# Patient Record
Sex: MALE | Race: WHITE | NOT HISPANIC OR LATINO | URBAN - METROPOLITAN AREA
[De-identification: names, ages, dates, MRNs, and addresses within clinical notes are randomized per-mention and may not be internally consistent; named-entity substitution may affect disease eponyms.]

---

## 2024-10-11 PROBLEM — Z00.00 ENCOUNTER FOR PREVENTIVE HEALTH EXAMINATION: Status: ACTIVE | Noted: 2024-10-11

## 2024-10-14 ENCOUNTER — INPATIENT (INPATIENT)
Facility: HOSPITAL | Age: 59
LOS: 7 days | Discharge: HOME HEALTH | DRG: 236 | End: 2024-10-22
Attending: THORACIC SURGERY (CARDIOTHORACIC VASCULAR SURGERY) | Admitting: THORACIC SURGERY (CARDIOTHORACIC VASCULAR SURGERY)
Payer: COMMERCIAL

## 2024-10-14 VITALS — OXYGEN SATURATION: 95 % | DIASTOLIC BLOOD PRESSURE: 89 MMHG | SYSTOLIC BLOOD PRESSURE: 144 MMHG | HEART RATE: 72 BPM

## 2024-10-14 DIAGNOSIS — Z98.890 OTHER SPECIFIED POSTPROCEDURAL STATES: Chronic | ICD-10-CM

## 2024-10-14 LAB
A1C WITH ESTIMATED AVERAGE GLUCOSE RESULT: 5.6 % — SIGNIFICANT CHANGE UP (ref 4–5.6)
ALBUMIN SERPL ELPH-MCNC: 4.6 G/DL — SIGNIFICANT CHANGE UP (ref 3.3–5)
ALP SERPL-CCNC: 72 U/L — SIGNIFICANT CHANGE UP (ref 40–120)
ALT FLD-CCNC: 15 U/L — SIGNIFICANT CHANGE UP (ref 10–45)
ANION GAP SERPL CALC-SCNC: 11 MMOL/L — SIGNIFICANT CHANGE UP (ref 5–17)
APTT BLD: 31.5 SEC — SIGNIFICANT CHANGE UP (ref 24.5–35.6)
AST SERPL-CCNC: 19 U/L — SIGNIFICANT CHANGE UP (ref 10–40)
BASOPHILS # BLD AUTO: 0.07 K/UL — SIGNIFICANT CHANGE UP (ref 0–0.2)
BASOPHILS NFR BLD AUTO: 0.8 % — SIGNIFICANT CHANGE UP (ref 0–2)
BILIRUB SERPL-MCNC: 0.6 MG/DL — SIGNIFICANT CHANGE UP (ref 0.2–1.2)
BLD GP AB SCN SERPL QL: NEGATIVE — SIGNIFICANT CHANGE UP
BUN SERPL-MCNC: 11 MG/DL — SIGNIFICANT CHANGE UP (ref 7–23)
CALCIUM SERPL-MCNC: 9.3 MG/DL — SIGNIFICANT CHANGE UP (ref 8.4–10.5)
CHLORIDE SERPL-SCNC: 102 MMOL/L — SIGNIFICANT CHANGE UP (ref 96–108)
CHOLEST SERPL-MCNC: 170 MG/DL — SIGNIFICANT CHANGE UP
CO2 SERPL-SCNC: 25 MMOL/L — SIGNIFICANT CHANGE UP (ref 22–31)
CREAT SERPL-MCNC: 0.96 MG/DL — SIGNIFICANT CHANGE UP (ref 0.5–1.3)
EGFR: 90 ML/MIN/1.73M2 — SIGNIFICANT CHANGE UP
EOSINOPHIL # BLD AUTO: 0.05 K/UL — SIGNIFICANT CHANGE UP (ref 0–0.5)
EOSINOPHIL NFR BLD AUTO: 0.5 % — SIGNIFICANT CHANGE UP (ref 0–6)
ESTIMATED AVERAGE GLUCOSE: 114 MG/DL — SIGNIFICANT CHANGE UP (ref 68–114)
GLUCOSE SERPL-MCNC: 98 MG/DL — SIGNIFICANT CHANGE UP (ref 70–99)
HCT VFR BLD CALC: 45.7 % — SIGNIFICANT CHANGE UP (ref 39–50)
HDLC SERPL-MCNC: 42 MG/DL — SIGNIFICANT CHANGE UP
HGB BLD-MCNC: 15.1 G/DL — SIGNIFICANT CHANGE UP (ref 13–17)
IMM GRANULOCYTES NFR BLD AUTO: 0.2 % — SIGNIFICANT CHANGE UP (ref 0–0.9)
INR BLD: 1.02 — SIGNIFICANT CHANGE UP (ref 0.85–1.16)
LIPID PNL WITH DIRECT LDL SERPL: 107 MG/DL — HIGH
LYMPHOCYTES # BLD AUTO: 1.51 K/UL — SIGNIFICANT CHANGE UP (ref 1–3.3)
LYMPHOCYTES # BLD AUTO: 16.4 % — SIGNIFICANT CHANGE UP (ref 13–44)
MCHC RBC-ENTMCNC: 28.1 PG — SIGNIFICANT CHANGE UP (ref 27–34)
MCHC RBC-ENTMCNC: 33 GM/DL — SIGNIFICANT CHANGE UP (ref 32–36)
MCV RBC AUTO: 84.9 FL — SIGNIFICANT CHANGE UP (ref 80–100)
MONOCYTES # BLD AUTO: 0.67 K/UL — SIGNIFICANT CHANGE UP (ref 0–0.9)
MONOCYTES NFR BLD AUTO: 7.3 % — SIGNIFICANT CHANGE UP (ref 2–14)
NEUTROPHILS # BLD AUTO: 6.87 K/UL — SIGNIFICANT CHANGE UP (ref 1.8–7.4)
NEUTROPHILS NFR BLD AUTO: 74.8 % — SIGNIFICANT CHANGE UP (ref 43–77)
NON HDL CHOLESTEROL: 128 MG/DL — SIGNIFICANT CHANGE UP
NRBC # BLD: 0 /100 WBCS — SIGNIFICANT CHANGE UP (ref 0–0)
NT-PROBNP SERPL-SCNC: <36 PG/ML — SIGNIFICANT CHANGE UP (ref 0–300)
PLATELET # BLD AUTO: 239 K/UL — SIGNIFICANT CHANGE UP (ref 150–400)
POTASSIUM SERPL-MCNC: 3.9 MMOL/L — SIGNIFICANT CHANGE UP (ref 3.5–5.3)
POTASSIUM SERPL-SCNC: 3.9 MMOL/L — SIGNIFICANT CHANGE UP (ref 3.5–5.3)
PROT SERPL-MCNC: 8 G/DL — SIGNIFICANT CHANGE UP (ref 6–8.3)
PROTHROM AB SERPL-ACNC: 11.9 SEC — SIGNIFICANT CHANGE UP (ref 9.9–13.4)
RBC # BLD: 5.38 M/UL — SIGNIFICANT CHANGE UP (ref 4.2–5.8)
RBC # FLD: 12.8 % — SIGNIFICANT CHANGE UP (ref 10.3–14.5)
RH IG SCN BLD-IMP: POSITIVE — SIGNIFICANT CHANGE UP
SODIUM SERPL-SCNC: 138 MMOL/L — SIGNIFICANT CHANGE UP (ref 135–145)
TRIGL SERPL-MCNC: 113 MG/DL — SIGNIFICANT CHANGE UP
TROPONIN T, HIGH SENSITIVITY RESULT: 9 NG/L — SIGNIFICANT CHANGE UP (ref 0–51)
TSH SERPL-MCNC: 6 UIU/ML — HIGH (ref 0.27–4.2)
WBC # BLD: 9.19 K/UL — SIGNIFICANT CHANGE UP (ref 3.8–10.5)
WBC # FLD AUTO: 9.19 K/UL — SIGNIFICANT CHANGE UP (ref 3.8–10.5)

## 2024-10-14 PROCEDURE — 93010 ELECTROCARDIOGRAM REPORT: CPT

## 2024-10-14 PROCEDURE — 71046 X-RAY EXAM CHEST 2 VIEWS: CPT | Mod: 26

## 2024-10-14 PROCEDURE — 93880 EXTRACRANIAL BILAT STUDY: CPT | Mod: 26

## 2024-10-14 PROCEDURE — 71250 CT THORAX DX C-: CPT | Mod: 26

## 2024-10-14 RX ORDER — METOPROLOL TARTRATE 50 MG
12.5 TABLET ORAL EVERY 12 HOURS
Refills: 0 | Status: DISCONTINUED | OUTPATIENT
Start: 2024-10-14 | End: 2024-10-16

## 2024-10-14 RX ORDER — INFLUENZ VIR VAC TV P-SURF2003 15MCG/.5ML
0.5 SYRINGE (ML) INTRAMUSCULAR ONCE
Refills: 0 | Status: DISCONTINUED | OUTPATIENT
Start: 2024-10-14 | End: 2024-10-22

## 2024-10-14 RX ORDER — ACETAMINOPHEN 500 MG
650 TABLET ORAL EVERY 6 HOURS
Refills: 0 | Status: DISCONTINUED | OUTPATIENT
Start: 2024-10-14 | End: 2024-10-16

## 2024-10-14 RX ORDER — PANTOPRAZOLE SODIUM 40 MG/1
40 TABLET, DELAYED RELEASE ORAL
Refills: 0 | Status: DISCONTINUED | OUTPATIENT
Start: 2024-10-14 | End: 2024-10-16

## 2024-10-14 RX ORDER — HEPARIN SODIUM 10000 [USP'U]/ML
5000 INJECTION INTRAVENOUS; SUBCUTANEOUS EVERY 8 HOURS
Refills: 0 | Status: DISCONTINUED | OUTPATIENT
Start: 2024-10-15 | End: 2024-10-16

## 2024-10-14 RX ORDER — POLYETHYLENE GLYCOL 3350 17 G/17G
17 POWDER, FOR SOLUTION ORAL DAILY
Refills: 0 | Status: DISCONTINUED | OUTPATIENT
Start: 2024-10-14 | End: 2024-10-16

## 2024-10-14 RX ORDER — SODIUM CHLORIDE 9 MG/ML
3 INJECTION, SOLUTION INTRAMUSCULAR; INTRAVENOUS; SUBCUTANEOUS EVERY 8 HOURS
Refills: 0 | Status: DISCONTINUED | OUTPATIENT
Start: 2024-10-14 | End: 2024-10-16

## 2024-10-14 RX ORDER — ASPIRIN/MAG CARB/ALUMINUM AMIN 325 MG
81 TABLET ORAL DAILY
Refills: 0 | Status: DISCONTINUED | OUTPATIENT
Start: 2024-10-14 | End: 2024-10-16

## 2024-10-14 RX ORDER — ROSUVASTATIN CALCIUM 10 MG
40 TABLET ORAL AT BEDTIME
Refills: 0 | Status: DISCONTINUED | OUTPATIENT
Start: 2024-10-14 | End: 2024-10-16

## 2024-10-14 RX ORDER — ROSUVASTATIN CALCIUM 10 MG
20 TABLET ORAL AT BEDTIME
Refills: 0 | Status: DISCONTINUED | OUTPATIENT
Start: 2024-10-14 | End: 2024-10-14

## 2024-10-14 RX ADMIN — Medication 40 MILLIGRAM(S): at 22:19

## 2024-10-14 RX ADMIN — Medication 12.5 MILLIGRAM(S): at 18:53

## 2024-10-14 RX ADMIN — SODIUM CHLORIDE 3 MILLILITER(S): 9 INJECTION, SOLUTION INTRAMUSCULAR; INTRAVENOUS; SUBCUTANEOUS at 22:14

## 2024-10-14 RX ADMIN — PANTOPRAZOLE SODIUM 40 MILLIGRAM(S): 40 TABLET, DELAYED RELEASE ORAL at 18:53

## 2024-10-14 NOTE — H&P ADULT - HISTORY OF PRESENT ILLNESS
Patient is a 59M with PMH of HTN, HLD, pre DM, fam hx of CAD who c/o STONER. Denies SOB at rest. Denies Chest pain. Patient had an abnormal stress test in 7/2024 and underwent a CCTA on 10/7/24 that was suspicious for CAD (findings below). On 10/10/24 he underwent a LHC at [facility] with Dr. Jackson which revealed 3v CAD (LM diffuse disease, moderate distal, LAD moderate diffuse prox, severe mid stenosis, severe diag stenosis, LCx: 70% OM stenosis, diffuse disease, RCA 80% mid distal stenosis, right dominant coronary circulation) & patient was referred to Dr. Jacob for surgical evaluation. He was admitted to [facility] on 10/14/24 for medical optimization prior to OR for CABG on 10/16/24.     FHx: father with CABG in his 70s; sister with sudden pre-mature death    Patient is a 59M with PMH of HTN, HLD, pre DM, fam hx of CAD who c/o STONER. Denies SOB at rest. Denies Chest pain. Patient had an abnormal stress test in 7/2024 and underwent a CCTA on 10/7/24 that was suspicious for CAD (findings below). On 10/10/24 he underwent a LHC at [facility] with Dr. Jackson which revealed 3v CAD (LM diffuse disease, moderate distal, LAD moderate diffuse prox, severe mid stenosis, severe diag stenosis, LCx: 70% OM stenosis, diffuse disease, RCA 80% mid distal stenosis, right dominant coronary circulation) & patient was referred to Dr. Jacob for surgical evaluation. He was admitted to [facility] on 10/14/24 for medical optimization prior to OR for CABG on 10/16/24.     Patient states that he was referred to a Cardiologist roughly 4 months ago once he turned 59 and for strong family hx of CAD. FHx: father with CABG in his 70s; sister with sudden pre-mature death. Patient states he is able to walk miles without issues. Never has any SOB, chest pain with exertion or at rest. Denies history of strokes, weakness, recent respiratory illness, asthma, copd, GI bleeds, Varicose veins, vein stripping.

## 2024-10-14 NOTE — H&P ADULT - NSHPREVIEWOFSYSTEMS_GEN_ALL_CORE
Review of Systems  CONSTITUTIONAL:  Denies Fevers / chills, sweats, fatigue, weight loss, weight gain                                      NEURO:  Denies parathesias, seizures, syncope, confusion                                                                                EYES:  Denies Blurry vision, discharge, pain, loss of vision                                                                                    ENMT:  Denies Difficulty hearing, vertigo, dysphagia, epistaxis, recent dental work                                       CV:  Denies Chest pain, palpitations, STONER, orthopnea                                                                                          RESPIRATORY:  Denies Wheezing, SOB, cough / sputum, hemoptysis                                                                GI:  Denies Nausea, vomiting, diarrhea, constipation, melena, difficulty swallowing                                               : Denies Hematuria, dysuria, urgency, incontinence                                                                                         MUSCULOSKELETAL:  Denies arthritis, joint swelling, muscle weakness                                                             SKIN/BREAST:  Denies rash, itching, hair loss, masses                                                                                            PSYCH:  Denies depression, anxiety, suicidal ideation                                                                                               HEME/LYMPH:  Denies bruises easily, enlarged lymph nodes, tender lymph nodes                                        ENDOCRINE:  Denies cold intolerance, heat intolerance, polydipsia

## 2024-10-14 NOTE — H&P ADULT - NSHPPHYSICALEXAM_GEN_ALL_CORE
Appearance: No acute distress.  Neurologic: AAOx3, no AMS or focal deficits.  Responds appropriately to verbal and physical stimuli; exhibits purposeful movement in all extremities.   Cardiovascular: RRR, S1 S2. No m/r/g.  Respiratory: No acute respiratory distress. CTA b/l, no w/r/r.   Gastrointestinal:  Soft, non-tender, non-distended, + BS.	  Skin: No rashes. No ecchymoses. No cyanosis.  Extremities: +scar to R knee. Exhibits normal range of motion, no clubbing, cyanosis or edema.  Vascular: Peripheral pulses palpable 2+ bilaterally.

## 2024-10-14 NOTE — H&P ADULT - NSHPLABSRESULTS_GEN_ALL_CORE
CTA 10/7/24: Calcium score 630, Lm 43, , LCirc 14,    Severe stenosis of the LM, severe stenosis of the prox, mid, distal LAD, severe stenosis of the   seoncd diagonal division, severe stenosis of the prox circm and first obtuse marginal, severe   stenosis of the prox and mid RCA and moderate stenosis of the distal RCA   Stress Test 7/5/24: no chest pain but abnormal EKG changes, ST changes depressions downsloping,   positive ST typical of ischemia     Echo 7/5/24:Lv EF > 55, mild MR, aortic valve trileaflet appears mildly calcified

## 2024-10-14 NOTE — H&P ADULT - NSHPSOCIALHISTORY_GEN_ALL_CORE
Denies tobacco/etoh/illicit drug use. Denies tobacco/etoh/illicit drug use.  Notes he lives with wife at home, works as , no walking devices used.

## 2024-10-14 NOTE — H&P ADULT - NSICDXPASTSURGICALHX_GEN_ALL_CORE_FT
PAST SURGICAL HISTORY:  H/O left inguinal hernia repair     H/O right inguinal hernia repair     H/O right knee surgery

## 2024-10-14 NOTE — H&P ADULT - ASSESSMENT
Patient is a 59M with PMH of HTN, HLD, pre DM, fam hx of CAD who c/o STONER. Denies SOB at rest. Denies Chest pain. Patient had an abnormal stress test in 7/2024 and underwent a CCTA on 10/7/24 that was suspicious for CAD (findings below). On 10/10/24 he underwent a LHC at [facility] with Dr. Jackson which revealed 3v CAD (LM diffuse disease, moderate distal, LAD moderate diffuse prox, severe mid stenosis, severe diag stenosis, LCx: 70% OM stenosis, diffuse disease, RCA 80% mid distal stenosis, right dominant coronary circulation) & patient was referred to Dr. Jacob for surgical evaluation. He was admitted to [facility] on 10/14/24 for medical optimization prior to OR for CABG on 10/16/24.     Plan:    Neurovascular:   -No delirium.   -Pain regimen, PRN's: Tylenol    Cardiovascular: 3V CAD  - Plan for OR for CABG on 10/16  - Pending carotid US & CXR; labs, UA, PFTs  - Continue lopressor 12.5mg q12hr, crestor 40mg, aspirin   -Hemodynamically stable. HR controlled.  -Continue to monitor HR, BP, telemetry      Respiratory: stable  -Oxygenating well on RA   -Encourage coughing and use of IS 10x / hr while awake.  -CXR: pending    GI: stable  -PPX: protonix  -PO Diet: dash/tlc  -Bowel regimen: miralax    Renal / : stable  -Monitor renal function.  -Monitor I/O's.  -BUN/Cr: 11 & 0.96    Endocrine: reported hx "pre-DM"; no hx thyroid disease  -A1c: 5.6  -TSH: pending    Hematologic: stable  -H/H: 15.1 & 45.7  -Coagulation Panel: pt 11.9 aptt 31.5 INR 1    ID:  -Temperature: afebrile  -WBC: 9.19    Prophylaxis:  -DVT prophylaxis with 5000 SubQ Heparin q8h  -Continue with SCD's    Disposition:  OR Wednesday    Patient is a 59M with PMH of HTN, HLD, pre DM, fam hx of CAD who was following w/ Cardiologist for Fam Hx of CAD and upon further screening, patient had an abnormal stress test in 7/2024 and underwent a CCTA on 10/7/24 that was suspicious for CAD (findings below). On 10/10/24 he underwent a LHC at [facility] with Dr. Jackson which revealed 3v CAD (LM diffuse disease, moderate distal, LAD moderate diffuse prox, severe mid stenosis, severe diag stenosis, LCx: 70% OM stenosis, diffuse disease, RCA 80% mid distal stenosis, right dominant coronary circulation) & patient was referred to Dr. Jacob for surgical evaluation. He was admitted to [facility] on 10/14/24 for medical optimization prior to OR for CABG on 10/16/24.     Plan:    Neurovascular:   -No delirium.   -Pain regimen, PRN's: Tylenol    Cardiovascular: 3V CAD  - Plan for OR for CABG on 10/16  - Pending carotid US & CXR; labs, UA, PFTs, Non con chest CT  - Continue lopressor 12.5mg q12hr, crestor 40mg, aspirin   -Hemodynamically stable. HR controlled.  -Continue to monitor HR, BP, telemetry      Respiratory: stable  -Oxygenating well on RA   -Encourage coughing and use of IS 10x / hr while awake.  -CXR: pending    GI: stable  -PPX: protonix  -PO Diet: dash/tlc  -Bowel regimen: miralax    Renal / : stable  -Monitor renal function.  -Monitor I/O's.  -BUN/Cr: 11 & 0.96    Endocrine: reported hx "pre-DM"; no hx thyroid disease  -A1c: 5.6  -TSH: pending    Hematologic: stable  -H/H: 15.1 & 45.7  -Coagulation Panel: pt 11.9 aptt 31.5 INR 1    ID:  -Temperature: afebrile  -WBC: 9.19    Prophylaxis:  -DVT prophylaxis with 5000 SubQ Heparin q8h  -Continue with SCD's    Disposition:  OR Wednesday

## 2024-10-15 LAB
ADD ON TEST-SPECIMEN IN LAB: SIGNIFICANT CHANGE UP
ALBUMIN SERPL ELPH-MCNC: 4.1 G/DL — SIGNIFICANT CHANGE UP (ref 3.3–5)
ALP SERPL-CCNC: 61 U/L — SIGNIFICANT CHANGE UP (ref 40–120)
ALT FLD-CCNC: 12 U/L — SIGNIFICANT CHANGE UP (ref 10–45)
ANION GAP SERPL CALC-SCNC: 10 MMOL/L — SIGNIFICANT CHANGE UP (ref 5–17)
APPEARANCE UR: CLEAR — SIGNIFICANT CHANGE UP
AST SERPL-CCNC: 15 U/L — SIGNIFICANT CHANGE UP (ref 10–40)
BASOPHILS # BLD AUTO: 0.1 K/UL — SIGNIFICANT CHANGE UP (ref 0–0.2)
BASOPHILS NFR BLD AUTO: 1.3 % — SIGNIFICANT CHANGE UP (ref 0–2)
BILIRUB SERPL-MCNC: 0.8 MG/DL — SIGNIFICANT CHANGE UP (ref 0.2–1.2)
BILIRUB UR-MCNC: NEGATIVE — SIGNIFICANT CHANGE UP
BLD GP AB SCN SERPL QL: NEGATIVE — SIGNIFICANT CHANGE UP
BUN SERPL-MCNC: 15 MG/DL — SIGNIFICANT CHANGE UP (ref 7–23)
CALCIUM SERPL-MCNC: 8.9 MG/DL — SIGNIFICANT CHANGE UP (ref 8.4–10.5)
CHLORIDE SERPL-SCNC: 104 MMOL/L — SIGNIFICANT CHANGE UP (ref 96–108)
CO2 SERPL-SCNC: 25 MMOL/L — SIGNIFICANT CHANGE UP (ref 22–31)
COLOR SPEC: YELLOW — SIGNIFICANT CHANGE UP
CREAT SERPL-MCNC: 1.06 MG/DL — SIGNIFICANT CHANGE UP (ref 0.5–1.3)
DIFF PNL FLD: NEGATIVE — SIGNIFICANT CHANGE UP
EGFR: 80 ML/MIN/1.73M2 — SIGNIFICANT CHANGE UP
EOSINOPHIL # BLD AUTO: 0.2 K/UL — SIGNIFICANT CHANGE UP (ref 0–0.5)
EOSINOPHIL NFR BLD AUTO: 2.6 % — SIGNIFICANT CHANGE UP (ref 0–6)
GLUCOSE SERPL-MCNC: 101 MG/DL — HIGH (ref 70–99)
GLUCOSE UR QL: NEGATIVE MG/DL — SIGNIFICANT CHANGE UP
HCT VFR BLD CALC: 45.5 % — SIGNIFICANT CHANGE UP (ref 39–50)
HGB BLD-MCNC: 14.9 G/DL — SIGNIFICANT CHANGE UP (ref 13–17)
IMM GRANULOCYTES NFR BLD AUTO: 0.1 % — SIGNIFICANT CHANGE UP (ref 0–0.9)
KETONES UR-MCNC: NEGATIVE MG/DL — SIGNIFICANT CHANGE UP
LEUKOCYTE ESTERASE UR-ACNC: NEGATIVE — SIGNIFICANT CHANGE UP
LYMPHOCYTES # BLD AUTO: 2.51 K/UL — SIGNIFICANT CHANGE UP (ref 1–3.3)
LYMPHOCYTES # BLD AUTO: 32.2 % — SIGNIFICANT CHANGE UP (ref 13–44)
MAGNESIUM SERPL-MCNC: 2.4 MG/DL — SIGNIFICANT CHANGE UP (ref 1.6–2.6)
MCHC RBC-ENTMCNC: 28.5 PG — SIGNIFICANT CHANGE UP (ref 27–34)
MCHC RBC-ENTMCNC: 32.7 GM/DL — SIGNIFICANT CHANGE UP (ref 32–36)
MCV RBC AUTO: 87.2 FL — SIGNIFICANT CHANGE UP (ref 80–100)
MONOCYTES # BLD AUTO: 0.74 K/UL — SIGNIFICANT CHANGE UP (ref 0–0.9)
MONOCYTES NFR BLD AUTO: 9.5 % — SIGNIFICANT CHANGE UP (ref 2–14)
NEUTROPHILS # BLD AUTO: 4.23 K/UL — SIGNIFICANT CHANGE UP (ref 1.8–7.4)
NEUTROPHILS NFR BLD AUTO: 54.3 % — SIGNIFICANT CHANGE UP (ref 43–77)
NITRITE UR-MCNC: NEGATIVE — SIGNIFICANT CHANGE UP
NRBC # BLD: 0 /100 WBCS — SIGNIFICANT CHANGE UP (ref 0–0)
PA ADP PRP-ACNC: 173 PRU — LOW (ref 182–335)
PA ADP PRP-ACNC: 192 PRU — SIGNIFICANT CHANGE UP (ref 182–335)
PA ADP PRP-ACNC: 194 PRU — SIGNIFICANT CHANGE UP (ref 182–335)
PH UR: 5.5 — SIGNIFICANT CHANGE UP (ref 5–8)
PLATELET # BLD AUTO: 189 K/UL — SIGNIFICANT CHANGE UP (ref 150–400)
POTASSIUM SERPL-MCNC: 4 MMOL/L — SIGNIFICANT CHANGE UP (ref 3.5–5.3)
POTASSIUM SERPL-SCNC: 4 MMOL/L — SIGNIFICANT CHANGE UP (ref 3.5–5.3)
PROT SERPL-MCNC: 6.9 G/DL — SIGNIFICANT CHANGE UP (ref 6–8.3)
PROT UR-MCNC: NEGATIVE MG/DL — SIGNIFICANT CHANGE UP
RBC # BLD: 5.22 M/UL — SIGNIFICANT CHANGE UP (ref 4.2–5.8)
RBC # FLD: 13.2 % — SIGNIFICANT CHANGE UP (ref 10.3–14.5)
RH IG SCN BLD-IMP: POSITIVE — SIGNIFICANT CHANGE UP
SODIUM SERPL-SCNC: 139 MMOL/L — SIGNIFICANT CHANGE UP (ref 135–145)
SP GR SPEC: 1.02 — SIGNIFICANT CHANGE UP (ref 1–1.03)
T4 AB SER-ACNC: 6.57 UG/DL — SIGNIFICANT CHANGE UP (ref 4.5–11.7)
UROBILINOGEN FLD QL: 0.2 MG/DL — SIGNIFICANT CHANGE UP (ref 0.2–1)
WBC # BLD: 7.79 K/UL — SIGNIFICANT CHANGE UP (ref 3.8–10.5)
WBC # FLD AUTO: 7.79 K/UL — SIGNIFICANT CHANGE UP (ref 3.8–10.5)

## 2024-10-15 PROCEDURE — 99232 SBSQ HOSP IP/OBS MODERATE 35: CPT

## 2024-10-15 PROCEDURE — 94010 BREATHING CAPACITY TEST: CPT | Mod: 26

## 2024-10-15 RX ORDER — CHLORHEXIDINE GLUCONATE 40 MG/ML
1 SOLUTION TOPICAL ONCE
Refills: 0 | Status: COMPLETED | OUTPATIENT
Start: 2024-10-15 | End: 2024-10-15

## 2024-10-15 RX ORDER — ASCORBIC ACID 500 MG
2000 TABLET ORAL AT BEDTIME
Refills: 0 | Status: DISCONTINUED | OUTPATIENT
Start: 2024-10-15 | End: 2024-10-16

## 2024-10-15 RX ORDER — CHLORHEXIDINE GLUCONATE 40 MG/ML
12 SOLUTION TOPICAL ONCE
Refills: 0 | Status: COMPLETED | OUTPATIENT
Start: 2024-10-15 | End: 2024-10-16

## 2024-10-15 RX ORDER — MUPIROCIN 20 MG/G
1 OINTMENT TOPICAL
Refills: 0 | Status: DISCONTINUED | OUTPATIENT
Start: 2024-10-16 | End: 2024-10-16

## 2024-10-15 RX ORDER — ACETAMINOPHEN 500 MG
975 TABLET ORAL ONCE
Refills: 0 | Status: COMPLETED | OUTPATIENT
Start: 2024-10-16 | End: 2024-10-16

## 2024-10-15 RX ADMIN — SODIUM CHLORIDE 3 MILLILITER(S): 9 INJECTION, SOLUTION INTRAMUSCULAR; INTRAVENOUS; SUBCUTANEOUS at 06:07

## 2024-10-15 RX ADMIN — SODIUM CHLORIDE 3 MILLILITER(S): 9 INJECTION, SOLUTION INTRAMUSCULAR; INTRAVENOUS; SUBCUTANEOUS at 14:11

## 2024-10-15 RX ADMIN — HEPARIN SODIUM 5000 UNIT(S): 10000 INJECTION INTRAVENOUS; SUBCUTANEOUS at 14:09

## 2024-10-15 RX ADMIN — Medication 81 MILLIGRAM(S): at 12:05

## 2024-10-15 RX ADMIN — CHLORHEXIDINE GLUCONATE 1 APPLICATION(S): 40 SOLUTION TOPICAL at 23:00

## 2024-10-15 RX ADMIN — HEPARIN SODIUM 5000 UNIT(S): 10000 INJECTION INTRAVENOUS; SUBCUTANEOUS at 22:18

## 2024-10-15 RX ADMIN — Medication 2000 MILLIGRAM(S): at 22:16

## 2024-10-15 RX ADMIN — Medication 40 MILLIGRAM(S): at 22:16

## 2024-10-15 RX ADMIN — CHLORHEXIDINE GLUCONATE 1 APPLICATION(S): 40 SOLUTION TOPICAL at 21:00

## 2024-10-15 RX ADMIN — Medication 12.5 MILLIGRAM(S): at 17:34

## 2024-10-15 RX ADMIN — PANTOPRAZOLE SODIUM 40 MILLIGRAM(S): 40 TABLET, DELAYED RELEASE ORAL at 06:46

## 2024-10-15 RX ADMIN — POLYETHYLENE GLYCOL 3350 17 GRAM(S): 17 POWDER, FOR SOLUTION ORAL at 12:05

## 2024-10-15 RX ADMIN — Medication 12.5 MILLIGRAM(S): at 06:47

## 2024-10-15 RX ADMIN — SODIUM CHLORIDE 3 MILLILITER(S): 9 INJECTION, SOLUTION INTRAMUSCULAR; INTRAVENOUS; SUBCUTANEOUS at 22:00

## 2024-10-15 NOTE — PROGRESS NOTE ADULT - ASSESSMENT
Patient is a 59M with PMH of HTN, HLD, pre DM, fam hx of CAD who was following w/ Cardiologist for Fam Hx of CAD and upon further screening, patient had an abnormal stress test in 7/2024 and underwent a CCTA on 10/7/24 that was suspicious for CAD (findings below). On 10/10/24 he underwent a LHC at [facility] with Dr. Jackson which revealed 3v CAD (LM diffuse disease, moderate distal, LAD moderate diffuse prox, severe mid stenosis, severe diag stenosis, LCx: 70% OM stenosis, diffuse disease, RCA 80% mid distal stenosis, right dominant coronary circulation) & patient was referred to Dr. Jacob for surgical evaluation. He was admitted to [facility] on 10/14/24 for medical optimization prior to OR for CABG on 10/16/24. Patient is consented, and NPO for CABG tomorrow.     Plan:    Neurovascular:   -No delirium.   -Pain regimen, PRN's: Tylenol    Cardiovascular: 3V CAD  - Plan for OR for CABG on 10/16  Patient completed Carotids, Chest CT, CXR PA/Lateral, PFTs   ERP ordered and blood on hold   C/w Lopressor 12.5mg BID, c/w ASA daily, c/w crestor 40mg daily   -Hemodynamically stable. HR controlled.  -Continue to monitor HR, BP, telemetry      Respiratory: stable  -Oxygenating well on RA   -Encourage coughing and use of IS 10x / hr while awake.  -CXR: no PTX or pleural effusions     GI: stable  -PPX: protonix  -PO Diet: dash/tlc  -Bowel regimen: miralax    Renal / : stable  -Monitor renal function.  -Monitor I/O's.  -BUN 15, Cr 1.06    Endocrine: reported hx "pre-DM"; no hx thyroid disease  -A1c: 5.6  -TSH: 6.00, T4 normal, T3 pending.     Hematologic: stable  CBC: RBC 5.22, hgb 14.9, Plt 189  -Coagulation Panel: PT 11.9, PTT 31.5, INR 1.02    ID:  -Temperature: afebrile  -WBC: 7.79    Prophylaxis:  -DVT prophylaxis with 5000 SubQ Heparin q8h  -Continue with SCD's    Disposition:  -Telemetry, planned for OR 10/16 for CABG with

## 2024-10-15 NOTE — CHART NOTE - NSCHARTNOTEFT_GEN_A_CORE
Patient is candidate for radial harvest during CABG. Evaluated for testing of radial artery. R handed, so L hand was tested. Testing done via doppler technique. Adequate flow appreciated in superficial and deep palmar arch upon compression of radial artery.  Impression   - radial artery appropriate conduit

## 2024-10-15 NOTE — PROGRESS NOTE ADULT - SUBJECTIVE AND OBJECTIVE BOX
CTICU  CRITICAL  CARE  attending     Hand off received 					   Pertinent clinical, laboratory, radiographic, hemodynamic, echocardiographic, respiratory data, microbiologic data and chart were reviewed and analyzed frequently throughout the course of the day and night  Patient seen and examined with CTS/ SH attending at bedside  Pt is a 59y , Male, HEALTH ISSUES - PROBLEM Dx:      , FAMILY HISTORY:  PAST MEDICAL & SURGICAL HISTORY:  CAD (coronary artery disease)      H/O right inguinal hernia repair      H/O left inguinal hernia repair      H/O right knee surgery        Patient is a 59y old  Male who presents with a chief complaint of CAD (15 Oct 2024 13:41)      14 system review limited by mentation and multiorgan morbidity     Vital signs, hemodynamic and respiratory parameters were reviewed from the bedside nursing flowsheet.  ICU Vital Signs Last 24 Hrs  T(C): 36.2 (16 Oct 2024 06:57), Max: 36.6 (15 Oct 2024 17:51)  T(F): 97.2 (16 Oct 2024 05:01), Max: 97.9 (15 Oct 2024 17:51)  HR: 66 (16 Oct 2024 15:00) (53 - 79)  BP: 131/75 (16 Oct 2024 06:57) (118/65 - 150/77)  BP(mean): 98 (16 Oct 2024 06:57) (86 - 102)  ABP: 103/62 (16 Oct 2024 15:00) (79/44 - 103/62)  ABP(mean): 77 (16 Oct 2024 15:00) (57 - 77)  RR: 12 (16 Oct 2024 15:00) (12 - 20)  SpO2: 98% (16 Oct 2024 15:00) (94% - 98%)    O2 Parameters below as of 16 Oct 2024 15:00  Patient On (Oxygen Delivery Method): ventilator    O2 Concentration (%): 70      Adult Advanced Hemodynamics Last 24 Hrs  CVP(mm Hg): 13 (16 Oct 2024 15:00) (8 - 13)  CVP(cm H2O): --  CO: --  CI: --  PA: --  PA(mean): --  PCWP: --  SVR: --  SVRI: --  PVR: --  PVRI: --, ABG - ( 16 Oct 2024 15:04 )  pH, Arterial: 7.35  pH, Blood: x     /  pCO2: 43    /  pO2: 107   / HCO3: 24    / Base Excess: -2.0  /  SaO2: 99.6                Intake and output was reviewed and the fluid balance was calculated  Daily Height in cm: 167.64 (16 Oct 2024 06:57)    Daily   I&O's Summary    15 Oct 2024 07:01  -  16 Oct 2024 07:00  --------------------------------------------------------  IN: 1020 mL / OUT: 275 mL / NET: 745 mL        All lines and drain sites were assessed  Glycemic trend was reviewedCAPILLARY BLOOD GLUCOSE        No acute change in focality  Auscultation of the chest reveals equal bs  Abdomen is soft  Extremities are warm and well perfused  Wounds appear clean and unremarkable  Antibiotics are periop    labs  CBC Full  -  ( 16 Oct 2024 14:57 )  WBC Count : 11.72 K/uL  RBC Count : 3.61 M/uL  Hemoglobin : 10.5 g/dL  Hematocrit : 31.3 %  Platelet Count - Automated : 115 K/uL  Mean Cell Volume : 86.7 fl  Mean Cell Hemoglobin : 29.1 pg  Mean Cell Hemoglobin Concentration : 33.5 gm/dL  Auto Neutrophil # : 9.06 K/uL  Auto Lymphocyte # : 1.45 K/uL  Auto Monocyte # : 1.07 K/uL  Auto Eosinophil # : 0.03 K/uL  Auto Basophil # : 0.03 K/uL  Auto Neutrophil % : 77.2 %  Auto Lymphocyte % : 12.4 %  Auto Monocyte % : 9.1 %  Auto Eosinophil % : 0.3 %  Auto Basophil % : 0.3 %    10-16    137  |  102  |  14  ----------------------------<  99  4.1   |  25  |  1.06    Ca    9.0      16 Oct 2024 04:37  Mg     2.3     10-16    TPro  7.2  /  Alb  4.4  /  TBili  0.7  /  DBili  x   /  AST  15  /  ALT  13  /  AlkPhos  63  10-16    PT/INR - ( 16 Oct 2024 14:57 )   PT: 14.9 sec;   INR: 1.28          PTT - ( 16 Oct 2024 14:57 )  PTT:29.0 sec  The current medications were reviewed   MEDICATIONS  (STANDING):  acetaminophen     Tablet .. 1000 milliGRAM(s) Oral every 6 hours  acetaminophen   IVPB .. 1000 milliGRAM(s) IV Intermittent every 6 hours  acetaminophen   IVPB .. 1000 milliGRAM(s) IV Intermittent every 6 hours  ascorbic acid 500 milliGRAM(s) Oral daily  aspirin enteric coated 81 milliGRAM(s) Oral daily  ceFAZolin   IVPB 2000 milliGRAM(s) IV Intermittent every 8 hours  chlorhexidine 0.12% Liquid 15 milliLiter(s) Oral Mucosa every 12 hours  chlorhexidine 2% Cloths 1 Application(s) Topical daily  dextrose 50% Injectable 50 milliLiter(s) IV Push every 15 minutes  dextrose 50% Injectable 25 milliLiter(s) IV Push every 15 minutes  gabapentin 100 milliGRAM(s) Oral three times a day  heparin   Injectable 5000 Unit(s) SubCutaneous every 8 hours  influenza   Vaccine 0.5 milliLiter(s) IntraMuscular once  insulin regular Infusion 1 Unit(s)/Hr (1 mL/Hr) IV Continuous <Continuous>  mupirocin 2% Nasal 1 Application(s) Both Nostrils two times a day  pantoprazole    Tablet 40 milliGRAM(s) Oral before breakfast  rosuvastatin 40 milliGRAM(s) Oral at bedtime  sodium chloride 0.9%. 1000 milliLiter(s) (10 mL/Hr) IV Continuous <Continuous>    MEDICATIONS  (PRN):       PROBLEM LIST/ ASSESSMENT:  HEALTH ISSUES - PROBLEM Dx:      ,   Patient is a 59y old  Male who presents with a chief complaint of CAD (15 Oct 2024 13:41)    preop for  cardiac surgery                My plan includes :  close hemodynamic, ventilatory and drain monitoring and management per post op routine    Monitor for arrhythmias and monitor parameters for organ perfusion  beta blockade not administered due to hemodynamic instability and bradycardia  monitor neurologic status  Head of the bed should remain elevated to 45 deg .   chest PT and IS will be encouraged  monitor adequacy of oxygenation and ventilation and attempt to wean oxygen  antibiotic regimen will be tailored to the clinical, laboratory and microbiologic data  Nutritional goals will be met using po eventually , ensure adequate caloric intake and montior the same  Stress ulcer and VTE prophylaxis will be achieved    Glycemic control is satisfactory  Electrolytes have been repleted as necessary and wound care has been carried out. Pain control has been achieved.   agressive physical therapy and early mobility and ambulation goals will be met   The family was updated about the course and plan  CRITICAL CARE TIME personally provided by me  in evaluation and management, reassessments, review and interpretation of labs and x-rays, ventilator and hemodynamic management, formulating a plan and coordinating care: __110___ MIN.  Time does not include procedural time. Time spent was non routine post-operarive caRE and included multiple and repeated evaluations at the bedside  CTICU ATTENDING     					    Philippe Tillman MD

## 2024-10-15 NOTE — PROGRESS NOTE ADULT - SUBJECTIVE AND OBJECTIVE BOX
Planned Date of Surgery: 10/16/24                                                                                                                 Surgeon:      Procedure: CABG     HPI:  59y Male without complaints this AM aside from some nerves before surgery. Everything was explained to patient and his questions were answered. Patient denies fever, chills, dizziness, chest pain, sob, abd pain, n/v/d.       PAST MEDICAL & SURGICAL HISTORY:  CAD (coronary artery disease)  H/O right inguinal hernia repair  H/O left inguinal hernia repair  H/O right knee surgery    penicillin (Other)    MEDICATIONS  (STANDING):  aspirin enteric coated 81 milliGRAM(s) Oral daily  heparin   Injectable 5000 Unit(s) SubCutaneous every 8 hours  influenza   Vaccine 0.5 milliLiter(s) IntraMuscular once  metoprolol tartrate 12.5 milliGRAM(s) Oral every 12 hours  pantoprazole    Tablet 40 milliGRAM(s) Oral before breakfast  polyethylene glycol 3350 17 Gram(s) Oral daily  rosuvastatin 40 milliGRAM(s) Oral at bedtime  sodium chloride 0.9% lock flush 3 milliLiter(s) IV Push every 8 hours    MEDICATIONS  (PRN):  acetaminophen     Tablet .. 650 milliGRAM(s) Oral every 6 hours PRN Mild Pain (1 - 3)      On Beta Blocker? YES      Physical Exam:  T(C): 36.4 (10-15-24 @ 09:33), Max: 36.6 (10-14-24 @ 21:39)  HR: 72 (10-15-24 @ 12:13) (56 - 76)  BP: 137/82 (10-15-24 @ 12:13) (119/74 - 152/77)  RR: 18 (10-15-24 @ 12:13) (18 - 18)  SpO2: 97% (10-15-24 @ 12:13) (94% - 97%)    Appearance: No acute distress.  Neurologic: AAOx3, no AMS or focal deficits.  Responds appropriately to verbal and physical stimuli; exhibits purposeful movement in all extremities.  Cardiovascular: RRR, S1 S2. No m/r/g.  Respiratory: No acute respiratory distress. CTA b/l, no w/r/r.   Gastrointestinal:  Soft, non-tender, non-distended, + BS.	  Skin: No rashes. No ecchymoses. No cyanosis.  Extremities: Exhibits normal range of motion, no clubbing, cyanosis or edema.  Vascular: Peripheral pulses palpable 2+ bilaterally.     Labs:                        14.9   7.79  )-----------( 189      ( 15 Oct 2024 05:30 )             45.5     10-15    139  |  104  |  15  ----------------------------<  101[H]  4.0   |  25  |  1.06    Ca    8.9      15 Oct 2024 05:30  Mg     2.4     10-15    TPro  6.9  /  Alb  4.1  /  TBili  0.8  /  DBili  x   /  AST  15  /  ALT  12  /  AlkPhos  61  10-15    PT/INR - ( 14 Oct 2024 14:33 )   PT: 11.9 sec;   INR: 1.02          PTT - ( 14 Oct 2024 14:33 )  PTT:31.5 sec  Urinalysis Basic - ( 15 Oct 2024 07:41 )    Color: Yellow / Appearance: Clear / S.021 / pH: x  Gluc: x / Ketone: Negative mg/dL  / Bili: Negative / Urobili: 0.2 mg/dL   Blood: x / Protein: Negative mg/dL / Nitrite: Negative   Leuk Esterase: Negative / RBC: x / WBC x   Sq Epi: x / Non Sq Epi: x / Bacteria: x                Hgb A1C:     EKG:    CXR:    CT Scans:    Cath Report:    Echo:    PFT's:    Carotid Duplex:    Consult in Chart?  YES / NO  Consent in Chart? YES / NO  Pre-op Orders Placed? YES / NO  Blood Prodeucts Ordered? YES / NO  NPO ordered? YES / NO      _____ performed the STS risk calculator and quoted a ____% operative mortality and complication risk and discussed the STS risk factors with the  patient including but not limited to death, heart attack, bleeding, stroke, kidney problems and infection.  ______ feels the patient will benefit  and  is a candidate for a _____. All questions were addressed and patient agrees to proceed with surgery. Planned Date of Surgery: 10/16/24                                                                                                                 Surgeon:      Procedure: CABG     HPI:  59y Male without complaints this AM aside from some nerves before surgery. Everything was explained to patient and his questions were answered. Patient denies fever, chills, dizziness, chest pain, sob, abd pain, n/v/d.     PAST MEDICAL & SURGICAL HISTORY:  CAD (coronary artery disease)  H/O right inguinal hernia repair  H/O left inguinal hernia repair  H/O right knee surgery    penicillin (Other)    MEDICATIONS  (STANDING):  aspirin enteric coated 81 milliGRAM(s) Oral daily  heparin   Injectable 5000 Unit(s) SubCutaneous every 8 hours  influenza   Vaccine 0.5 milliLiter(s) IntraMuscular once  metoprolol tartrate 12.5 milliGRAM(s) Oral every 12 hours  pantoprazole    Tablet 40 milliGRAM(s) Oral before breakfast  polyethylene glycol 3350 17 Gram(s) Oral daily  rosuvastatin 40 milliGRAM(s) Oral at bedtime  sodium chloride 0.9% lock flush 3 milliLiter(s) IV Push every 8 hours    MEDICATIONS  (PRN):  acetaminophen     Tablet .. 650 milliGRAM(s) Oral every 6 hours PRN Mild Pain (1 - 3)      On Beta Blocker? YES      Physical Exam:  T(C): 36.4 (10-15-24 @ 09:33), Max: 36.6 (10-14-24 @ 21:39)  HR: 72 (10-15-24 @ 12:13) (56 - 76)  BP: 137/82 (10-15-24 @ 12:13) (119/74 - 152/77)  RR: 18 (10-15-24 @ 12:13) (18 - 18)  SpO2: 97% (10-15-24 @ 12:13) (94% - 97%)    Appearance: No acute distress.  Neurologic: AAOx3, no AMS or focal deficits.  Responds appropriately to verbal and physical stimuli; exhibits purposeful movement in all extremities.  Cardiovascular: RRR, S1 S2. No m/r/g.  Respiratory: No acute respiratory distress. CTA b/l, no w/r/r.   Gastrointestinal:  Soft, non-tender, non-distended, + BS.	  Skin: No rashes. No ecchymoses. No cyanosis.  Extremities: Exhibits normal range of motion, no clubbing, cyanosis or edema.  Vascular: Peripheral pulses palpable 2+ bilaterally.     Labs:                        14.9   7.79  )-----------( 189      ( 15 Oct 2024 05:30 )             45.5     10-15    139  |  104  |  15  ----------------------------<  101[H]  4.0   |  25  |  1.06    Ca    8.9      15 Oct 2024 05:30  Mg     2.4     10-15    TPro  6.9  /  Alb  4.1  /  TBili  0.8  /  DBili  x   /  AST  15  /  ALT  12  /  AlkPhos  61  10-15    PT/INR - ( 14 Oct 2024 14:33 )   PT: 11.9 sec;   INR: 1.02          PTT - ( 14 Oct 2024 14:33 )  PTT:31.5 sec  Urinalysis Basic - ( 15 Oct 2024 07:41 )    Color: Yellow / Appearance: Clear / S.021 / pH: x  Gluc: x / Ketone: Negative mg/dL  / Bili: Negative / Urobili: 0.2 mg/dL   Blood: x / Protein: Negative mg/dL / Nitrite: Negative   Leuk Esterase: Negative / RBC: x / WBC x   Sq Epi: x / Non Sq Epi: x / Bacteria: x      Hgb A1C: 5.6%    EKG: in chart    CXR:   Xray Chest 2 Views PA/Lat (10.14.24 @ 20:20) >  Lungs clear. No infiltrate pleural effusion or pneumothorax. Unremarkable   cardiac silhouette. No acute bone abnormality.    CT Scans:  CT Chest No Cont (10.14.24 @ 20:18) >  No CT evidence of acute disease in the chest.    Cath Report:  in chart    Echo:  Completed     PFT's:      Carotid Duplex:    Consult in Chart?  YES / NO  Consent in Chart? YES / NO  Pre-op Orders Placed? YES / NO  Blood Prodeucts Ordered? YES / NO  NPO ordered? YES / NO      _____ performed the STS risk calculator and quoted a ____% operative mortality and complication risk and discussed the STS risk factors with the  patient including but not limited to death, heart attack, bleeding, stroke, kidney problems and infection.  ______ feels the patient will benefit  and  is a candidate for a _____. All questions were addressed and patient agrees to proceed with surgery. Planned Date of Surgery: 10/16/24                                                                                                                 Surgeon:      Procedure: CABG     HPI:  59y Male without complaints this AM aside from some nerves before surgery. Everything was explained to patient and his questions were answered. Patient denies fever, chills, dizziness, chest pain, sob, abd pain, n/v/d.     PAST MEDICAL & SURGICAL HISTORY:  CAD (coronary artery disease)  H/O right inguinal hernia repair  H/O left inguinal hernia repair  H/O right knee surgery    penicillin (Other)    MEDICATIONS  (STANDING):  aspirin enteric coated 81 milliGRAM(s) Oral daily  heparin   Injectable 5000 Unit(s) SubCutaneous every 8 hours  influenza   Vaccine 0.5 milliLiter(s) IntraMuscular once  metoprolol tartrate 12.5 milliGRAM(s) Oral every 12 hours  pantoprazole    Tablet 40 milliGRAM(s) Oral before breakfast  polyethylene glycol 3350 17 Gram(s) Oral daily  rosuvastatin 40 milliGRAM(s) Oral at bedtime  sodium chloride 0.9% lock flush 3 milliLiter(s) IV Push every 8 hours    MEDICATIONS  (PRN):  acetaminophen     Tablet .. 650 milliGRAM(s) Oral every 6 hours PRN Mild Pain (1 - 3)      On Beta Blocker? YES      Physical Exam:  T(C): 36.4 (10-15-24 @ 09:33), Max: 36.6 (10-14-24 @ 21:39)  HR: 72 (10-15-24 @ 12:13) (56 - 76)  BP: 137/82 (10-15-24 @ 12:13) (119/74 - 152/77)  RR: 18 (10-15-24 @ 12:13) (18 - 18)  SpO2: 97% (10-15-24 @ 12:13) (94% - 97%)    Appearance: No acute distress.  Neurologic: AAOx3, no AMS or focal deficits.  Responds appropriately to verbal and physical stimuli; exhibits purposeful movement in all extremities.  Cardiovascular: RRR, S1 S2. No m/r/g.  Respiratory: No acute respiratory distress. CTA b/l, no w/r/r.   Gastrointestinal:  Soft, non-tender, non-distended, + BS.	  Skin: No rashes. No ecchymoses. No cyanosis.  Extremities: Exhibits normal range of motion, no clubbing, cyanosis or edema.  Vascular: Peripheral pulses palpable 2+ bilaterally.     Labs:                        14.9   7.79  )-----------( 189      ( 15 Oct 2024 05:30 )             45.5     10-15    139  |  104  |  15  ----------------------------<  101[H]  4.0   |  25  |  1.06    Ca    8.9      15 Oct 2024 05:30  Mg     2.4     10-15    TPro  6.9  /  Alb  4.1  /  TBili  0.8  /  DBili  x   /  AST  15  /  ALT  12  /  AlkPhos  61  10-15    PT/INR - ( 14 Oct 2024 14:33 )   PT: 11.9 sec;   INR: 1.02          PTT - ( 14 Oct 2024 14:33 )  PTT:31.5 sec  Urinalysis Basic - ( 15 Oct 2024 07:41 )    Color: Yellow / Appearance: Clear / S.021 / pH: x  Gluc: x / Ketone: Negative mg/dL  / Bili: Negative / Urobili: 0.2 mg/dL   Blood: x / Protein: Negative mg/dL / Nitrite: Negative   Leuk Esterase: Negative / RBC: x / WBC x   Sq Epi: x / Non Sq Epi: x / Bacteria: x      Hgb A1C: 5.6%    EKG: in chart    CXR:   Xray Chest 2 Views PA/Lat (10.14.24 @ 20:20) >  Lungs clear. No infiltrate pleural effusion or pneumothorax. Unremarkable   cardiac silhouette. No acute bone abnormality.    CT Scans:  CT Chest No Cont (10.14.24 @ 20:18) >  No CT evidence of acute disease in the chest.    Cath Report:  in chart    Echo:  Completed     PFT's:  In chart    Carotid Duplex:  No stenosis     Consent in Chart? YES  Pre-op Orders Placed? YES   Blood Prodeucts Ordered? YES   NPO ordered? YES      Dr. Jacob performed the STS risk calculator and quoted a 0.476% operative mortality and complication risk and discussed the STS risk factors with the  patient including but not limited to death, heart attack, bleeding, stroke, kidney problems and infection. Dr. Jacob feels the patient will benefit  and  is a candidate for a CABG. All questions were addressed and patient agrees to proceed with surgery.

## 2024-10-16 ENCOUNTER — APPOINTMENT (OUTPATIENT)
Dept: CARDIOTHORACIC SURGERY | Facility: HOSPITAL | Age: 59
End: 2024-10-16

## 2024-10-16 ENCOUNTER — TRANSCRIPTION ENCOUNTER (OUTPATIENT)
Age: 59
End: 2024-10-16

## 2024-10-16 LAB
ALBUMIN SERPL ELPH-MCNC: 2.2 G/DL — LOW (ref 3.3–5)
ALBUMIN SERPL ELPH-MCNC: 3.1 G/DL — LOW (ref 3.3–5)
ALBUMIN SERPL ELPH-MCNC: 3.5 G/DL — SIGNIFICANT CHANGE UP (ref 3.3–5)
ALBUMIN SERPL ELPH-MCNC: 4.4 G/DL — SIGNIFICANT CHANGE UP (ref 3.3–5)
ALP SERPL-CCNC: 38 U/L — LOW (ref 40–120)
ALP SERPL-CCNC: 39 U/L — LOW (ref 40–120)
ALP SERPL-CCNC: 39 U/L — LOW (ref 40–120)
ALP SERPL-CCNC: 63 U/L — SIGNIFICANT CHANGE UP (ref 40–120)
ALT FLD-CCNC: 13 U/L — SIGNIFICANT CHANGE UP (ref 10–45)
ALT FLD-CCNC: 13 U/L — SIGNIFICANT CHANGE UP (ref 10–45)
ALT FLD-CCNC: 14 U/L — SIGNIFICANT CHANGE UP (ref 10–45)
ALT FLD-CCNC: 15 U/L — SIGNIFICANT CHANGE UP (ref 10–45)
ANION GAP SERPL CALC-SCNC: 10 MMOL/L — SIGNIFICANT CHANGE UP (ref 5–17)
ANION GAP SERPL CALC-SCNC: 6 MMOL/L — SIGNIFICANT CHANGE UP (ref 5–17)
ANION GAP SERPL CALC-SCNC: 8 MMOL/L — SIGNIFICANT CHANGE UP (ref 5–17)
ANION GAP SERPL CALC-SCNC: 8 MMOL/L — SIGNIFICANT CHANGE UP (ref 5–17)
APTT BLD: 29 SEC — SIGNIFICANT CHANGE UP (ref 24.5–35.6)
APTT BLD: 37.5 SEC — HIGH (ref 24.5–35.6)
APTT BLD: 39.5 SEC — HIGH (ref 24.5–35.6)
APTT BLD: 48.4 SEC — HIGH (ref 24.5–35.6)
AST SERPL-CCNC: 15 U/L — SIGNIFICANT CHANGE UP (ref 10–40)
AST SERPL-CCNC: 63 U/L — HIGH (ref 10–40)
AST SERPL-CCNC: 64 U/L — HIGH (ref 10–40)
AST SERPL-CCNC: SIGNIFICANT CHANGE UP (ref 10–40)
BASE EXCESS BLDA CALC-SCNC: -0.6 MMOL/L — SIGNIFICANT CHANGE UP (ref -2–3)
BASE EXCESS BLDA CALC-SCNC: -1.3 MMOL/L — SIGNIFICANT CHANGE UP (ref -2–3)
BASE EXCESS BLDA CALC-SCNC: -1.5 MMOL/L — SIGNIFICANT CHANGE UP (ref -2–3)
BASE EXCESS BLDA CALC-SCNC: -2.3 MMOL/L — LOW (ref -2–3)
BASE EXCESS BLDA CALC-SCNC: 0.3 MMOL/L — SIGNIFICANT CHANGE UP (ref -2–3)
BASE EXCESS BLDA CALC-SCNC: 0.9 MMOL/L — SIGNIFICANT CHANGE UP (ref -2–3)
BASE EXCESS BLDA CALC-SCNC: 2.4 MMOL/L — SIGNIFICANT CHANGE UP (ref -2–3)
BASE EXCESS BLDA CALC-SCNC: 3.3 MMOL/L — HIGH (ref -2–3)
BASE EXCESS BLDV CALC-SCNC: 2.3 MMOL/L — SIGNIFICANT CHANGE UP (ref -2–3)
BASOPHILS # BLD AUTO: 0.01 K/UL — SIGNIFICANT CHANGE UP (ref 0–0.2)
BASOPHILS # BLD AUTO: 0.02 K/UL — SIGNIFICANT CHANGE UP (ref 0–0.2)
BASOPHILS # BLD AUTO: 0.03 K/UL — SIGNIFICANT CHANGE UP (ref 0–0.2)
BASOPHILS NFR BLD AUTO: 0.1 % — SIGNIFICANT CHANGE UP (ref 0–2)
BASOPHILS NFR BLD AUTO: 0.2 % — SIGNIFICANT CHANGE UP (ref 0–2)
BASOPHILS NFR BLD AUTO: 0.3 % — SIGNIFICANT CHANGE UP (ref 0–2)
BILIRUB SERPL-MCNC: 0.3 MG/DL — SIGNIFICANT CHANGE UP (ref 0.2–1.2)
BILIRUB SERPL-MCNC: 0.6 MG/DL — SIGNIFICANT CHANGE UP (ref 0.2–1.2)
BILIRUB SERPL-MCNC: 0.7 MG/DL — SIGNIFICANT CHANGE UP (ref 0.2–1.2)
BILIRUB SERPL-MCNC: 0.8 MG/DL — SIGNIFICANT CHANGE UP (ref 0.2–1.2)
BLD GP AB SCN SERPL QL: NEGATIVE — SIGNIFICANT CHANGE UP
BUN SERPL-MCNC: 14 MG/DL — SIGNIFICANT CHANGE UP (ref 7–23)
BUN SERPL-MCNC: 7 MG/DL — SIGNIFICANT CHANGE UP (ref 7–23)
BUN SERPL-MCNC: 8 MG/DL — SIGNIFICANT CHANGE UP (ref 7–23)
BUN SERPL-MCNC: 9 MG/DL — SIGNIFICANT CHANGE UP (ref 7–23)
CA-I BLDA-SCNC: 0.74 MMOL/L — LOW (ref 1.15–1.33)
CA-I BLDA-SCNC: 0.86 MMOL/L — LOW (ref 1.15–1.33)
CA-I BLDA-SCNC: 0.92 MMOL/L — LOW (ref 1.15–1.33)
CA-I BLDA-SCNC: 0.95 MMOL/L — LOW (ref 1.15–1.33)
CA-I BLDA-SCNC: 1.07 MMOL/L — LOW (ref 1.15–1.33)
CA-I BLDA-SCNC: 1.1 MMOL/L — LOW (ref 1.15–1.33)
CA-I BLDA-SCNC: 1.11 MMOL/L — LOW (ref 1.15–1.33)
CA-I BLDA-SCNC: 1.11 MMOL/L — LOW (ref 1.15–1.33)
CA-I SERPL-SCNC: 0.96 MMOL/L — LOW (ref 1.15–1.33)
CALCIUM SERPL-MCNC: 7.4 MG/DL — LOW (ref 8.4–10.5)
CALCIUM SERPL-MCNC: 7.7 MG/DL — LOW (ref 8.4–10.5)
CALCIUM SERPL-MCNC: 8.1 MG/DL — LOW (ref 8.4–10.5)
CALCIUM SERPL-MCNC: 9 MG/DL — SIGNIFICANT CHANGE UP (ref 8.4–10.5)
CHLORIDE SERPL-SCNC: 102 MMOL/L — SIGNIFICANT CHANGE UP (ref 96–108)
CHLORIDE SERPL-SCNC: 105 MMOL/L — SIGNIFICANT CHANGE UP (ref 96–108)
CHLORIDE SERPL-SCNC: 106 MMOL/L — SIGNIFICANT CHANGE UP (ref 96–108)
CHLORIDE SERPL-SCNC: 109 MMOL/L — HIGH (ref 96–108)
CO2 BLDA-SCNC: 24 MMOL/L — SIGNIFICANT CHANGE UP (ref 19–24)
CO2 BLDA-SCNC: 25 MMOL/L — HIGH (ref 19–24)
CO2 BLDA-SCNC: 25 MMOL/L — HIGH (ref 19–24)
CO2 BLDA-SCNC: 27 MMOL/L — HIGH (ref 19–24)
CO2 BLDA-SCNC: 28 MMOL/L — HIGH (ref 19–24)
CO2 BLDA-SCNC: 29 MMOL/L — HIGH (ref 19–24)
CO2 BLDV-SCNC: 29 MMOL/L — HIGH (ref 22–26)
CO2 SERPL-SCNC: 22 MMOL/L — SIGNIFICANT CHANGE UP (ref 22–31)
CO2 SERPL-SCNC: 24 MMOL/L — SIGNIFICANT CHANGE UP (ref 22–31)
CO2 SERPL-SCNC: 24 MMOL/L — SIGNIFICANT CHANGE UP (ref 22–31)
CO2 SERPL-SCNC: 25 MMOL/L — SIGNIFICANT CHANGE UP (ref 22–31)
COHGB MFR BLDA: 1 % — SIGNIFICANT CHANGE UP
COHGB MFR BLDA: 1 % — SIGNIFICANT CHANGE UP
COHGB MFR BLDA: 1.1 % — SIGNIFICANT CHANGE UP
COHGB MFR BLDA: 1.3 % — SIGNIFICANT CHANGE UP
COHGB MFR BLDA: 1.3 % — SIGNIFICANT CHANGE UP
COHGB MFR BLDA: 1.5 % — SIGNIFICANT CHANGE UP
COHGB MFR BLDA: 1.6 % — SIGNIFICANT CHANGE UP
COHGB MFR BLDA: 1.6 % — SIGNIFICANT CHANGE UP
COHGB MFR BLDV: 1.6 % — SIGNIFICANT CHANGE UP
CREAT SERPL-MCNC: 0.69 MG/DL — SIGNIFICANT CHANGE UP (ref 0.5–1.3)
CREAT SERPL-MCNC: 0.79 MG/DL — SIGNIFICANT CHANGE UP (ref 0.5–1.3)
CREAT SERPL-MCNC: 0.8 MG/DL — SIGNIFICANT CHANGE UP (ref 0.5–1.3)
CREAT SERPL-MCNC: 1.06 MG/DL — SIGNIFICANT CHANGE UP (ref 0.5–1.3)
EGFR: 101 ML/MIN/1.73M2 — SIGNIFICANT CHANGE UP
EGFR: 102 ML/MIN/1.73M2 — SIGNIFICANT CHANGE UP
EGFR: 106 ML/MIN/1.73M2 — SIGNIFICANT CHANGE UP
EGFR: 80 ML/MIN/1.73M2 — SIGNIFICANT CHANGE UP
EOSINOPHIL # BLD AUTO: 0 K/UL — SIGNIFICANT CHANGE UP (ref 0–0.5)
EOSINOPHIL # BLD AUTO: 0.01 K/UL — SIGNIFICANT CHANGE UP (ref 0–0.5)
EOSINOPHIL # BLD AUTO: 0.03 K/UL — SIGNIFICANT CHANGE UP (ref 0–0.5)
EOSINOPHIL NFR BLD AUTO: 0 % — SIGNIFICANT CHANGE UP (ref 0–6)
EOSINOPHIL NFR BLD AUTO: 0.1 % — SIGNIFICANT CHANGE UP (ref 0–6)
EOSINOPHIL NFR BLD AUTO: 0.3 % — SIGNIFICANT CHANGE UP (ref 0–6)
GAS PNL BLDA: SIGNIFICANT CHANGE UP
GAS PNL BLDV: 136 MMOL/L — SIGNIFICANT CHANGE UP (ref 136–145)
GLUCOSE BLDA-MCNC: 109 MG/DL — HIGH (ref 70–99)
GLUCOSE BLDA-MCNC: 111 MG/DL — HIGH (ref 70–99)
GLUCOSE BLDA-MCNC: 126 MG/DL — HIGH (ref 70–99)
GLUCOSE BLDA-MCNC: 137 MG/DL — HIGH (ref 70–99)
GLUCOSE BLDA-MCNC: 180 MG/DL — HIGH (ref 70–99)
GLUCOSE BLDA-MCNC: 187 MG/DL — HIGH (ref 70–99)
GLUCOSE BLDA-MCNC: 208 MG/DL — HIGH (ref 70–99)
GLUCOSE BLDA-MCNC: 256 MG/DL — HIGH (ref 70–99)
GLUCOSE BLDC GLUCOMTR-MCNC: 132 MG/DL — HIGH (ref 70–99)
GLUCOSE BLDV-MCNC: 128 MG/DL — HIGH (ref 70–99)
GLUCOSE SERPL-MCNC: 105 MG/DL — HIGH (ref 70–99)
GLUCOSE SERPL-MCNC: 140 MG/DL — HIGH (ref 70–99)
GLUCOSE SERPL-MCNC: 143 MG/DL — HIGH (ref 70–99)
GLUCOSE SERPL-MCNC: 99 MG/DL — SIGNIFICANT CHANGE UP (ref 70–99)
HCO3 BLDA-SCNC: 23 MMOL/L — SIGNIFICANT CHANGE UP (ref 21–28)
HCO3 BLDA-SCNC: 24 MMOL/L — SIGNIFICANT CHANGE UP (ref 21–28)
HCO3 BLDA-SCNC: 24 MMOL/L — SIGNIFICANT CHANGE UP (ref 21–28)
HCO3 BLDA-SCNC: 25 MMOL/L — SIGNIFICANT CHANGE UP (ref 21–28)
HCO3 BLDA-SCNC: 26 MMOL/L — SIGNIFICANT CHANGE UP (ref 21–28)
HCO3 BLDA-SCNC: 26 MMOL/L — SIGNIFICANT CHANGE UP (ref 21–28)
HCO3 BLDA-SCNC: 27 MMOL/L — SIGNIFICANT CHANGE UP (ref 21–28)
HCO3 BLDA-SCNC: 28 MMOL/L — SIGNIFICANT CHANGE UP (ref 21–28)
HCO3 BLDV-SCNC: 28 MMOL/L — SIGNIFICANT CHANGE UP (ref 22–29)
HCT VFR BLD CALC: 31.3 % — LOW (ref 39–50)
HCT VFR BLD CALC: 32.2 % — LOW (ref 39–50)
HCT VFR BLD CALC: 32.7 % — LOW (ref 39–50)
HCT VFR BLD CALC: 44.6 % — SIGNIFICANT CHANGE UP (ref 39–50)
HCT VFR BLDA CALC: 26 % — SIGNIFICANT CHANGE UP
HGB BLD CALC-MCNC: 8.7 G/DL — LOW (ref 12.6–17.4)
HGB BLD-MCNC: 10.5 G/DL — LOW (ref 13–17)
HGB BLD-MCNC: 10.5 G/DL — LOW (ref 13–17)
HGB BLD-MCNC: 11.1 G/DL — LOW (ref 13–17)
HGB BLD-MCNC: 14.7 G/DL — SIGNIFICANT CHANGE UP (ref 13–17)
HGB BLDA-MCNC: 10.1 G/DL — LOW (ref 12.6–17.4)
HGB BLDA-MCNC: 10.2 G/DL — LOW (ref 12.6–17.4)
HGB BLDA-MCNC: 13.4 G/DL — SIGNIFICANT CHANGE UP (ref 12.6–17.4)
HGB BLDA-MCNC: 14.5 G/DL — SIGNIFICANT CHANGE UP (ref 12.6–17.4)
HGB BLDA-MCNC: 8.5 G/DL — LOW (ref 12.6–17.4)
HGB BLDA-MCNC: 8.8 G/DL — LOW (ref 12.6–17.4)
HGB BLDA-MCNC: 9.6 G/DL — LOW (ref 12.6–17.4)
HGB BLDA-MCNC: 9.6 G/DL — LOW (ref 12.6–17.4)
IMM GRANULOCYTES NFR BLD AUTO: 0.4 % — SIGNIFICANT CHANGE UP (ref 0–0.9)
IMM GRANULOCYTES NFR BLD AUTO: 0.4 % — SIGNIFICANT CHANGE UP (ref 0–0.9)
IMM GRANULOCYTES NFR BLD AUTO: 0.7 % — SIGNIFICANT CHANGE UP (ref 0–0.9)
INR BLD: 1.02 — SIGNIFICANT CHANGE UP (ref 0.85–1.16)
INR BLD: 1.16 — SIGNIFICANT CHANGE UP (ref 0.85–1.16)
INR BLD: 1.16 — SIGNIFICANT CHANGE UP (ref 0.85–1.16)
INR BLD: 1.28 — HIGH (ref 0.85–1.16)
LYMPHOCYTES # BLD AUTO: 0.51 K/UL — LOW (ref 1–3.3)
LYMPHOCYTES # BLD AUTO: 0.79 K/UL — LOW (ref 1–3.3)
LYMPHOCYTES # BLD AUTO: 1.45 K/UL — SIGNIFICANT CHANGE UP (ref 1–3.3)
LYMPHOCYTES # BLD AUTO: 12.4 % — LOW (ref 13–44)
LYMPHOCYTES # BLD AUTO: 5 % — LOW (ref 13–44)
LYMPHOCYTES # BLD AUTO: 6.9 % — LOW (ref 13–44)
MAGNESIUM SERPL-MCNC: 2.1 MG/DL — SIGNIFICANT CHANGE UP (ref 1.6–2.6)
MAGNESIUM SERPL-MCNC: 2.2 MG/DL — SIGNIFICANT CHANGE UP (ref 1.6–2.6)
MAGNESIUM SERPL-MCNC: 2.3 MG/DL — SIGNIFICANT CHANGE UP (ref 1.6–2.6)
MAGNESIUM SERPL-MCNC: 2.3 MG/DL — SIGNIFICANT CHANGE UP (ref 1.6–2.6)
MCHC RBC-ENTMCNC: 27.8 PG — SIGNIFICANT CHANGE UP (ref 27–34)
MCHC RBC-ENTMCNC: 27.9 PG — SIGNIFICANT CHANGE UP (ref 27–34)
MCHC RBC-ENTMCNC: 29.1 PG — SIGNIFICANT CHANGE UP (ref 27–34)
MCHC RBC-ENTMCNC: 29.2 PG — SIGNIFICANT CHANGE UP (ref 27–34)
MCHC RBC-ENTMCNC: 32.6 GM/DL — SIGNIFICANT CHANGE UP (ref 32–36)
MCHC RBC-ENTMCNC: 33 GM/DL — SIGNIFICANT CHANGE UP (ref 32–36)
MCHC RBC-ENTMCNC: 33.5 GM/DL — SIGNIFICANT CHANGE UP (ref 32–36)
MCHC RBC-ENTMCNC: 33.9 GM/DL — SIGNIFICANT CHANGE UP (ref 32–36)
MCV RBC AUTO: 84.8 FL — SIGNIFICANT CHANGE UP (ref 80–100)
MCV RBC AUTO: 85.2 FL — SIGNIFICANT CHANGE UP (ref 80–100)
MCV RBC AUTO: 86.1 FL — SIGNIFICANT CHANGE UP (ref 80–100)
MCV RBC AUTO: 86.7 FL — SIGNIFICANT CHANGE UP (ref 80–100)
METHGB MFR BLDA: 1 % — SIGNIFICANT CHANGE UP
METHGB MFR BLDA: 1.2 % — SIGNIFICANT CHANGE UP
METHGB MFR BLDA: 1.2 % — SIGNIFICANT CHANGE UP
METHGB MFR BLDA: 1.3 % — SIGNIFICANT CHANGE UP
METHGB MFR BLDA: 1.4 % — SIGNIFICANT CHANGE UP
METHGB MFR BLDA: 1.9 % — HIGH
METHGB MFR BLDV: 1.6 % — HIGH
MONOCYTES # BLD AUTO: 0.75 K/UL — SIGNIFICANT CHANGE UP (ref 0–0.9)
MONOCYTES # BLD AUTO: 0.87 K/UL — SIGNIFICANT CHANGE UP (ref 0–0.9)
MONOCYTES # BLD AUTO: 1.07 K/UL — HIGH (ref 0–0.9)
MONOCYTES NFR BLD AUTO: 7.3 % — SIGNIFICANT CHANGE UP (ref 2–14)
MONOCYTES NFR BLD AUTO: 7.6 % — SIGNIFICANT CHANGE UP (ref 2–14)
MONOCYTES NFR BLD AUTO: 9.1 % — SIGNIFICANT CHANGE UP (ref 2–14)
NEUTROPHILS # BLD AUTO: 8.9 K/UL — HIGH (ref 1.8–7.4)
NEUTROPHILS # BLD AUTO: 9.06 K/UL — HIGH (ref 1.8–7.4)
NEUTROPHILS # BLD AUTO: 9.76 K/UL — HIGH (ref 1.8–7.4)
NEUTROPHILS NFR BLD AUTO: 77.2 % — HIGH (ref 43–77)
NEUTROPHILS NFR BLD AUTO: 84.8 % — HIGH (ref 43–77)
NEUTROPHILS NFR BLD AUTO: 87.2 % — HIGH (ref 43–77)
NRBC # BLD: 0 /100 WBCS — SIGNIFICANT CHANGE UP (ref 0–0)
OXYHGB MFR BLDA: 96.1 % — HIGH (ref 90–95)
OXYHGB MFR BLDA: 97.1 % — HIGH (ref 90–95)
OXYHGB MFR BLDA: 97.2 % — HIGH (ref 90–95)
OXYHGB MFR BLDA: 97.2 % — HIGH (ref 90–95)
OXYHGB MFR BLDA: 97.4 % — HIGH (ref 90–95)
OXYHGB MFR BLDA: 97.6 % — HIGH (ref 90–95)
OXYHGB MFR BLDA: 97.7 % — HIGH (ref 90–95)
OXYHGB MFR BLDA: 97.7 % — HIGH (ref 90–95)
PA ADP PRP-ACNC: 207 PRU — SIGNIFICANT CHANGE UP (ref 182–335)
PCO2 BLDA: 40 MMHG — SIGNIFICANT CHANGE UP (ref 35–48)
PCO2 BLDA: 41 MMHG — SIGNIFICANT CHANGE UP (ref 35–48)
PCO2 BLDA: 42 MMHG — SIGNIFICANT CHANGE UP (ref 35–48)
PCO2 BLDA: 43 MMHG — SIGNIFICANT CHANGE UP (ref 35–48)
PCO2 BLDA: 45 MMHG — SIGNIFICANT CHANGE UP (ref 35–48)
PCO2 BLDA: 47 MMHG — SIGNIFICANT CHANGE UP (ref 35–48)
PCO2 BLDV: 47 MMHG — SIGNIFICANT CHANGE UP (ref 42–55)
PH BLDA: 7.34 — LOW (ref 7.35–7.45)
PH BLDA: 7.35 — SIGNIFICANT CHANGE UP (ref 7.35–7.45)
PH BLDA: 7.37 — SIGNIFICANT CHANGE UP (ref 7.35–7.45)
PH BLDA: 7.37 — SIGNIFICANT CHANGE UP (ref 7.35–7.45)
PH BLDA: 7.38 — SIGNIFICANT CHANGE UP (ref 7.35–7.45)
PH BLDA: 7.39 — SIGNIFICANT CHANGE UP (ref 7.35–7.45)
PH BLDA: 7.42 — SIGNIFICANT CHANGE UP (ref 7.35–7.45)
PH BLDA: 7.43 — SIGNIFICANT CHANGE UP (ref 7.35–7.45)
PH BLDV: 7.38 — SIGNIFICANT CHANGE UP (ref 7.32–7.43)
PHOSPHATE SERPL-MCNC: 2.7 MG/DL — SIGNIFICANT CHANGE UP (ref 2.5–4.5)
PHOSPHATE SERPL-MCNC: 3 MG/DL — SIGNIFICANT CHANGE UP (ref 2.5–4.5)
PHOSPHATE SERPL-MCNC: 3.1 MG/DL — SIGNIFICANT CHANGE UP (ref 2.5–4.5)
PLATELET # BLD AUTO: 115 K/UL — LOW (ref 150–400)
PLATELET # BLD AUTO: 125 K/UL — LOW (ref 150–400)
PLATELET # BLD AUTO: 145 K/UL — LOW (ref 150–400)
PLATELET # BLD AUTO: 208 K/UL — SIGNIFICANT CHANGE UP (ref 150–400)
PO2 BLDA: 131 MMHG — HIGH (ref 83–108)
PO2 BLDA: 166 MMHG — HIGH (ref 83–108)
PO2 BLDA: 330 MMHG — HIGH (ref 83–108)
PO2 BLDA: 334 MMHG — HIGH (ref 83–108)
PO2 BLDA: 422 MMHG — HIGH (ref 83–108)
PO2 BLDA: 423 MMHG — HIGH (ref 83–108)
PO2 BLDA: 445 MMHG — HIGH (ref 83–108)
PO2 BLDA: 510 MMHG — HIGH (ref 83–108)
PO2 BLDV: 53 MMHG — HIGH (ref 25–45)
POTASSIUM BLDA-SCNC: 3.4 MMOL/L — LOW (ref 3.5–5.1)
POTASSIUM BLDA-SCNC: 3.7 MMOL/L — SIGNIFICANT CHANGE UP (ref 3.5–5.1)
POTASSIUM BLDA-SCNC: 3.9 MMOL/L — SIGNIFICANT CHANGE UP (ref 3.5–5.1)
POTASSIUM BLDA-SCNC: 4.1 MMOL/L — SIGNIFICANT CHANGE UP (ref 3.5–5.1)
POTASSIUM BLDA-SCNC: 4.2 MMOL/L — SIGNIFICANT CHANGE UP (ref 3.5–5.1)
POTASSIUM BLDA-SCNC: 4.3 MMOL/L — SIGNIFICANT CHANGE UP (ref 3.5–5.1)
POTASSIUM BLDV-SCNC: 3.9 MMOL/L — SIGNIFICANT CHANGE UP (ref 3.5–5.1)
POTASSIUM SERPL-MCNC: 4.1 MMOL/L — SIGNIFICANT CHANGE UP (ref 3.5–5.3)
POTASSIUM SERPL-MCNC: 4.2 MMOL/L — SIGNIFICANT CHANGE UP (ref 3.5–5.3)
POTASSIUM SERPL-MCNC: 4.3 MMOL/L — SIGNIFICANT CHANGE UP (ref 3.5–5.3)
POTASSIUM SERPL-MCNC: 4.7 MMOL/L — SIGNIFICANT CHANGE UP (ref 3.5–5.3)
POTASSIUM SERPL-SCNC: 4.1 MMOL/L — SIGNIFICANT CHANGE UP (ref 3.5–5.3)
POTASSIUM SERPL-SCNC: 4.2 MMOL/L — SIGNIFICANT CHANGE UP (ref 3.5–5.3)
POTASSIUM SERPL-SCNC: 4.3 MMOL/L — SIGNIFICANT CHANGE UP (ref 3.5–5.3)
POTASSIUM SERPL-SCNC: 4.7 MMOL/L — SIGNIFICANT CHANGE UP (ref 3.5–5.3)
PROT SERPL-MCNC: 3.8 G/DL — LOW (ref 6–8.3)
PROT SERPL-MCNC: 5 G/DL — LOW (ref 6–8.3)
PROT SERPL-MCNC: 5.1 G/DL — LOW (ref 6–8.3)
PROT SERPL-MCNC: 7.2 G/DL — SIGNIFICANT CHANGE UP (ref 6–8.3)
PROTHROM AB SERPL-ACNC: 11.7 SEC — SIGNIFICANT CHANGE UP (ref 9.9–13.4)
PROTHROM AB SERPL-ACNC: 13.6 SEC — HIGH (ref 9.9–13.4)
PROTHROM AB SERPL-ACNC: 13.6 SEC — HIGH (ref 9.9–13.4)
PROTHROM AB SERPL-ACNC: 14.9 SEC — HIGH (ref 9.9–13.4)
RBC # BLD: 3.61 M/UL — LOW (ref 4.2–5.8)
RBC # BLD: 3.78 M/UL — LOW (ref 4.2–5.8)
RBC # BLD: 3.8 M/UL — LOW (ref 4.2–5.8)
RBC # BLD: 5.26 M/UL — SIGNIFICANT CHANGE UP (ref 4.2–5.8)
RBC # FLD: 12.8 % — SIGNIFICANT CHANGE UP (ref 10.3–14.5)
RBC # FLD: 13 % — SIGNIFICANT CHANGE UP (ref 10.3–14.5)
RH IG SCN BLD-IMP: POSITIVE — SIGNIFICANT CHANGE UP
SAO2 % BLDA: 100 % — HIGH (ref 94–98)
SAO2 % BLDA: 99.5 % — HIGH (ref 94–98)
SAO2 % BLDA: 99.6 % — HIGH (ref 94–98)
SAO2 % BLDA: 99.7 % — HIGH (ref 94–98)
SAO2 % BLDA: 99.8 % — HIGH (ref 94–98)
SAO2 % BLDV: 89 % — HIGH (ref 67–88)
SODIUM BLDA-SCNC: 133 MMOL/L — LOW (ref 136–145)
SODIUM BLDA-SCNC: 134 MMOL/L — LOW (ref 136–145)
SODIUM BLDA-SCNC: 134 MMOL/L — LOW (ref 136–145)
SODIUM BLDA-SCNC: 135 MMOL/L — LOW (ref 136–145)
SODIUM BLDA-SCNC: 136 MMOL/L — SIGNIFICANT CHANGE UP (ref 136–145)
SODIUM BLDA-SCNC: 136 MMOL/L — SIGNIFICANT CHANGE UP (ref 136–145)
SODIUM SERPL-SCNC: 135 MMOL/L — SIGNIFICANT CHANGE UP (ref 135–145)
SODIUM SERPL-SCNC: 136 MMOL/L — SIGNIFICANT CHANGE UP (ref 135–145)
SODIUM SERPL-SCNC: 137 MMOL/L — SIGNIFICANT CHANGE UP (ref 135–145)
SODIUM SERPL-SCNC: 141 MMOL/L — SIGNIFICANT CHANGE UP (ref 135–145)
T3 SERPL-MCNC: 113 NG/DL — SIGNIFICANT CHANGE UP (ref 80–200)
WBC # BLD: 10.21 K/UL — SIGNIFICANT CHANGE UP (ref 3.8–10.5)
WBC # BLD: 11.5 K/UL — HIGH (ref 3.8–10.5)
WBC # BLD: 11.72 K/UL — HIGH (ref 3.8–10.5)
WBC # BLD: 8.38 K/UL — SIGNIFICANT CHANGE UP (ref 3.8–10.5)
WBC # FLD AUTO: 10.21 K/UL — SIGNIFICANT CHANGE UP (ref 3.8–10.5)
WBC # FLD AUTO: 11.5 K/UL — HIGH (ref 3.8–10.5)
WBC # FLD AUTO: 11.72 K/UL — HIGH (ref 3.8–10.5)
WBC # FLD AUTO: 8.38 K/UL — SIGNIFICANT CHANGE UP (ref 3.8–10.5)

## 2024-10-16 PROCEDURE — 33518 CABG ARTERY-VEIN TWO: CPT | Mod: AS

## 2024-10-16 PROCEDURE — 71045 X-RAY EXAM CHEST 1 VIEW: CPT | Mod: 26,76

## 2024-10-16 PROCEDURE — 33518 CABG ARTERY-VEIN TWO: CPT

## 2024-10-16 PROCEDURE — 33534 CABG ARTERIAL TWO: CPT | Mod: AS

## 2024-10-16 PROCEDURE — 33534 CABG ARTERIAL TWO: CPT

## 2024-10-16 PROCEDURE — 35600 OPEN HRV UXTR ART 1 SGM CAB: CPT | Mod: AS

## 2024-10-16 PROCEDURE — 99292 CRITICAL CARE ADDL 30 MIN: CPT

## 2024-10-16 PROCEDURE — 99291 CRITICAL CARE FIRST HOUR: CPT

## 2024-10-16 PROCEDURE — 93010 ELECTROCARDIOGRAM REPORT: CPT

## 2024-10-16 DEVICE — LIGATING CLIPS WECK HORIZON SMALL (YELLOW) 24: Type: IMPLANTABLE DEVICE | Status: FUNCTIONAL

## 2024-10-16 DEVICE — CANNULA VENOUS 3 STAGE STANDARD 29/37/37FR X 1/2": Type: IMPLANTABLE DEVICE | Status: FUNCTIONAL

## 2024-10-16 DEVICE — LIGATING CLIPS WECK HORIZON MEDIUM (BLUE) 24: Type: IMPLANTABLE DEVICE | Status: FUNCTIONAL

## 2024-10-16 DEVICE — CLIP APPLIER ETHICON LIGACLIP 9 3/8" SMALL: Type: IMPLANTABLE DEVICE | Status: FUNCTIONAL

## 2024-10-16 DEVICE — CATH VENT LEFT HEART SILICONE 20FR NON-VENTED: Type: IMPLANTABLE DEVICE | Status: FUNCTIONAL

## 2024-10-16 DEVICE — CATH SILICONE THORACIC STR 24FR 20/BX: Type: IMPLANTABLE DEVICE | Status: FUNCTIONAL

## 2024-10-16 DEVICE — LIGATING CLIPS WECK HORIZON SMALL-WIDE (RED) 24: Type: IMPLANTABLE DEVICE | Status: FUNCTIONAL

## 2024-10-16 DEVICE — CHEST DRAIN THORACIC PVC 32FR: Type: IMPLANTABLE DEVICE | Status: FUNCTIONAL

## 2024-10-16 DEVICE — IMPLANTABLE DEVICE: Type: IMPLANTABLE DEVICE | Status: FUNCTIONAL

## 2024-10-16 DEVICE — PACING WIRE STREAMLINE BIPOLAR MYOCARDIAL: Type: IMPLANTABLE DEVICE | Status: FUNCTIONAL

## 2024-10-16 RX ORDER — CHLORHEXIDINE GLUCONATE 40 MG/ML
15 SOLUTION TOPICAL EVERY 12 HOURS
Refills: 0 | Status: DISCONTINUED | OUTPATIENT
Start: 2024-10-16 | End: 2024-10-18

## 2024-10-16 RX ORDER — ALBUMIN HUMAN 50 G/1000ML
250 SOLUTION INTRAVENOUS
Refills: 0 | Status: COMPLETED | OUTPATIENT
Start: 2024-10-16 | End: 2024-10-16

## 2024-10-16 RX ORDER — ACETAMINOPHEN 500 MG
1000 TABLET ORAL EVERY 6 HOURS
Refills: 0 | Status: DISCONTINUED | OUTPATIENT
Start: 2024-10-17 | End: 2024-10-22

## 2024-10-16 RX ORDER — POLYETHYLENE GLYCOL 3350 17 G/17G
17 POWDER, FOR SOLUTION ORAL DAILY
Refills: 0 | Status: DISCONTINUED | OUTPATIENT
Start: 2024-10-16 | End: 2024-10-22

## 2024-10-16 RX ORDER — ACETAMINOPHEN 500 MG
1000 TABLET ORAL EVERY 6 HOURS
Refills: 0 | Status: DISCONTINUED | OUTPATIENT
Start: 2024-10-16 | End: 2024-10-16

## 2024-10-16 RX ORDER — SENNA 187 MG
2 TABLET ORAL AT BEDTIME
Refills: 0 | Status: DISCONTINUED | OUTPATIENT
Start: 2024-10-16 | End: 2024-10-22

## 2024-10-16 RX ORDER — MUPIROCIN 20 MG/G
1 OINTMENT TOPICAL
Refills: 0 | Status: COMPLETED | OUTPATIENT
Start: 2024-10-16 | End: 2024-10-21

## 2024-10-16 RX ORDER — OXYCODONE HYDROCHLORIDE 30 MG/1
5 TABLET ORAL EVERY 6 HOURS
Refills: 0 | Status: DISCONTINUED | OUTPATIENT
Start: 2024-10-16 | End: 2024-10-20

## 2024-10-16 RX ORDER — ACETAMINOPHEN 500 MG
1000 TABLET ORAL EVERY 6 HOURS
Refills: 0 | Status: COMPLETED | OUTPATIENT
Start: 2024-10-16 | End: 2024-10-17

## 2024-10-16 RX ORDER — INSULIN REG, HUM S-S BUFF 100/ML
1 VIAL (ML) INJECTION
Qty: 50 | Refills: 0 | Status: DISCONTINUED | OUTPATIENT
Start: 2024-10-16 | End: 2024-10-17

## 2024-10-16 RX ORDER — ROSUVASTATIN CALCIUM 10 MG
40 TABLET ORAL AT BEDTIME
Refills: 0 | Status: DISCONTINUED | OUTPATIENT
Start: 2024-10-16 | End: 2024-10-17

## 2024-10-16 RX ORDER — ASPIRIN/MAG CARB/ALUMINUM AMIN 325 MG
81 TABLET ORAL DAILY
Refills: 0 | Status: DISCONTINUED | OUTPATIENT
Start: 2024-10-16 | End: 2024-10-22

## 2024-10-16 RX ORDER — NOREPINEPHRINE BITARTRATE 1 MG/ML
0.05 INJECTION, SOLUTION, CONCENTRATE INTRAVENOUS
Qty: 8 | Refills: 0 | Status: DISCONTINUED | OUTPATIENT
Start: 2024-10-16 | End: 2024-10-17

## 2024-10-16 RX ORDER — CALCIUM GLUCONATE 98 MG/ML
2 INJECTION, SOLUTION INTRAVENOUS ONCE
Refills: 0 | Status: COMPLETED | OUTPATIENT
Start: 2024-10-16 | End: 2024-10-16

## 2024-10-16 RX ORDER — CHLORHEXIDINE GLUCONATE 40 MG/ML
1 SOLUTION TOPICAL DAILY
Refills: 0 | Status: DISCONTINUED | OUTPATIENT
Start: 2024-10-16 | End: 2024-10-22

## 2024-10-16 RX ORDER — HEPARIN SODIUM 10000 [USP'U]/ML
5000 INJECTION INTRAVENOUS; SUBCUTANEOUS EVERY 8 HOURS
Refills: 0 | Status: DISCONTINUED | OUTPATIENT
Start: 2024-10-16 | End: 2024-10-22

## 2024-10-16 RX ORDER — CEFAZOLIN SODIUM 1 G
2000 VIAL (EA) INJECTION EVERY 8 HOURS
Refills: 0 | Status: COMPLETED | OUTPATIENT
Start: 2024-10-16 | End: 2024-10-18

## 2024-10-16 RX ORDER — SODIUM CHLORIDE 9 MG/ML
1000 INJECTION, SOLUTION INTRAMUSCULAR; INTRAVENOUS; SUBCUTANEOUS
Refills: 0 | Status: DISCONTINUED | OUTPATIENT
Start: 2024-10-16 | End: 2024-10-22

## 2024-10-16 RX ORDER — GABAPENTIN 300 MG/1
100 CAPSULE ORAL THREE TIMES A DAY
Refills: 0 | Status: DISCONTINUED | OUTPATIENT
Start: 2024-10-16 | End: 2024-10-22

## 2024-10-16 RX ORDER — PANTOPRAZOLE SODIUM 40 MG/1
40 TABLET, DELAYED RELEASE ORAL
Refills: 0 | Status: DISCONTINUED | OUTPATIENT
Start: 2024-10-16 | End: 2024-10-22

## 2024-10-16 RX ORDER — DEXMEDETOMIDINE HYDROCHLORIDE 400 UG/100ML
0.2 INJECTION, SOLUTION INTRAVENOUS
Qty: 400 | Refills: 0 | Status: DISCONTINUED | OUTPATIENT
Start: 2024-10-16 | End: 2024-10-16

## 2024-10-16 RX ORDER — ASCORBIC ACID 500 MG
500 TABLET ORAL DAILY
Refills: 0 | Status: DISCONTINUED | OUTPATIENT
Start: 2024-10-16 | End: 2024-10-22

## 2024-10-16 RX ORDER — OXYCODONE HYDROCHLORIDE 30 MG/1
10 TABLET ORAL EVERY 6 HOURS
Refills: 0 | Status: DISCONTINUED | OUTPATIENT
Start: 2024-10-16 | End: 2024-10-18

## 2024-10-16 RX ADMIN — HEPARIN SODIUM 5000 UNIT(S): 10000 INJECTION INTRAVENOUS; SUBCUTANEOUS at 21:51

## 2024-10-16 RX ADMIN — CALCIUM GLUCONATE 200 GRAM(S): 98 INJECTION, SOLUTION INTRAVENOUS at 16:48

## 2024-10-16 RX ADMIN — Medication 1000 MILLIGRAM(S): at 19:30

## 2024-10-16 RX ADMIN — MUPIROCIN 1 APPLICATION(S): 20 OINTMENT TOPICAL at 05:01

## 2024-10-16 RX ADMIN — Medication 2 TABLET(S): at 21:51

## 2024-10-16 RX ADMIN — Medication 975 MILLIGRAM(S): at 05:02

## 2024-10-16 RX ADMIN — Medication 400 MILLIGRAM(S): at 23:28

## 2024-10-16 RX ADMIN — GABAPENTIN 100 MILLIGRAM(S): 300 CAPSULE ORAL at 21:51

## 2024-10-16 RX ADMIN — MUPIROCIN 1 APPLICATION(S): 20 OINTMENT TOPICAL at 19:39

## 2024-10-16 RX ADMIN — ALBUMIN HUMAN 125 MILLILITER(S): 50 SOLUTION INTRAVENOUS at 15:45

## 2024-10-16 RX ADMIN — Medication 975 MILLIGRAM(S): at 06:02

## 2024-10-16 RX ADMIN — HEPARIN SODIUM 5000 UNIT(S): 10000 INJECTION INTRAVENOUS; SUBCUTANEOUS at 05:01

## 2024-10-16 RX ADMIN — CHLORHEXIDINE GLUCONATE 12 MILLILITER(S): 40 SOLUTION TOPICAL at 05:00

## 2024-10-16 RX ADMIN — Medication 400 MILLIGRAM(S): at 19:21

## 2024-10-16 RX ADMIN — Medication 100 MILLIGRAM(S): at 19:47

## 2024-10-16 RX ADMIN — ALBUMIN HUMAN 125 MILLILITER(S): 50 SOLUTION INTRAVENOUS at 15:30

## 2024-10-16 RX ADMIN — NOREPINEPHRINE BITARTRATE 7.53 MICROGRAM(S)/KG/MIN: 1 INJECTION, SOLUTION, CONCENTRATE INTRAVENOUS at 19:20

## 2024-10-16 RX ADMIN — SODIUM CHLORIDE 3 MILLILITER(S): 9 INJECTION, SOLUTION INTRAMUSCULAR; INTRAVENOUS; SUBCUTANEOUS at 06:00

## 2024-10-16 RX ADMIN — Medication 40 MILLIGRAM(S): at 21:51

## 2024-10-16 NOTE — BRIEF OPERATIVE NOTE - COMMENTS
I first assisted for the entirety of the case, including but not limited to conduit harvest, cannulation, distal/proximal anastamoses, decannulation and chest closure.

## 2024-10-16 NOTE — PROGRESS NOTE ADULT - SUBJECTIVE AND OBJECTIVE BOX
INTERVAl COURSE   POD#0  CABGx 4   EF 65%   Arrived intubated on low dose levophed, woke up nonfocal   CTs output low   Labs in good range except for mild post op anemia and thrombocytopenia   A-a gradient improving with higher PEEP   Good UOP in response to fluid     VITALS  Vital Signs Last 24 Hrs  T(C): 36.2 (16 Oct 2024 06:57), Max: 36.6 (15 Oct 2024 17:51)  T(F): 97.2 (16 Oct 2024 05:01), Max: 97.9 (15 Oct 2024 17:51)  HR: 69 (16 Oct 2024 15:30) (53 - 79)  BP: 131/75 (16 Oct 2024 06:57) (118/65 - 150/77)  BP(mean): 98 (16 Oct 2024 06:57) (86 - 102)  RR: 14 (16 Oct 2024 15:30) (12 - 20)  SpO2: 100% (16 Oct 2024 15:30) (94% - 100%)  Parameters below as of 16 Oct 2024 15:30  Patient On (Oxygen Delivery Method): ventilator    O2 Concentration (%): 70    I&O's Summary    15 Oct 2024 07:01  -  16 Oct 2024 07:00  --------------------------------------------------------  IN: 1020 mL / OUT: 275 mL / NET: 745 mL    PHYSICAL EXAM  General: lightly sedated, arousable and following commands   Respiratory: CTA B/L; no wheezes  Cardiovascular: Regular rhythm/rate  Gastrointestinal: Soft  Extremities: WWP; no edema  Neurological:  CNII-XII grossly intact; no obvious focal deficits    LABS/IMAGING/EKG/ETC  Reviewed.

## 2024-10-16 NOTE — PROGRESS NOTE ADULT - ASSESSMENT
59M w/PMHx of HTN, pre DM and Fhx of CAD presented with abnormal stress test underwent [facility] 10/10 revealing mvCAD at [facility]. Now admitted at [facility] for medical optimization prior to OR for CABG on 10/16/24.   S/p CABGx4 (LIMA-LAD, RA-OM, SVG-PDA, SVG-Diag)   EF normal   10/16  Arrived intubated on low dose levophed  Woke up nonfocal   A/p   Vasoplegic on low dose levophed, MAPs responding to colloid boluses, in Sinus  Normal perfusion labs and warm on exam, UOP responding well to IVF  Labs in normal range except for post op anemia and thrombocytopenia still in good range though HCT 31%/ PLT > 100- Normal coags   Low CTs output-- continue to monitor on suction   Replete lytes  High A-a gradient improving with higher PEEP --> continue to monitor serial ABGs/ CXR clear with no pneumo  Continue ASA/Statin and ICU ppx     Woke up nonfocal, currently lightly sedated - on precedex/ planning for SBT/CPAP once PFR improved   Will consider transitioning levo with midodrine once extubated and passed dysphagia     ATTENDING: I have personally and independently provided 105 min of critical care services. This excludes any time spent on separate procedures or teaching.

## 2024-10-16 NOTE — PROGRESS NOTE ADULT - SUBJECTIVE AND OBJECTIVE BOX
CTICU  CRITICAL  CARE  attending     Hand off received 					   Pertinent clinical, laboratory, radiographic, hemodynamic, echocardiographic, respiratory data, microbiologic data and chart were reviewed and analyzed frequently throughout the course of the day and night      59 year old male with HTN, HLD, pre DM, fam hx of CAD.  He was evaluated for STONER.   Stress test was positive for ischemia.  CCTA was suspicious for CAD   On 10/10/24 he underwent a LHC at [facility] with Dr. Jackson revealed 3v CAD (LM diffuse disease, moderate distal, LAD moderate diffuse prox, severe mid stenosis, severe diag stenosis, LCx: 70% OM stenosis, diffuse disease, RCA 80% mid distal stenosis.  The patient was referred to Dr. Jacob for CAG      S/P CABG X 4       FAMILY HISTORY:  PAST MEDICAL & SURGICAL HISTORY:  CAD (coronary artery disease)  H/O right inguinal hernia repair  H/O left inguinal hernia repair  H/O right knee surgery          14 system review was unremarkable    Vital signs, hemodynamic and respiratory parameters were reviewed from the bedside nursing flow sheet.  ICU Vital Signs Last 24 Hrs  T(C): 36.2 (16 Oct 2024 17:29), Max: 36.3 (15 Oct 2024 22:49)  T(F): 97.1 (16 Oct 2024 17:29), Max: 97.4 (15 Oct 2024 22:49)  HR: 63 (16 Oct 2024 21:00) (53 - 79)  BP: 131/75 (16 Oct 2024 06:57) (118/65 - 131/75)  BP(mean): 98 (16 Oct 2024 06:57) (86 - 98)  ABP: 129/84 (16 Oct 2024 21:00) (79/44 - 129/84)  ABP(mean): 101 (16 Oct 2024 21:00) (57 - 101)  RR: 16 (16 Oct 2024 21:00) (12 - 20)  SpO2: 98% (16 Oct 2024 21:00) (94% - 100%)    O2 Parameters below as of 16 Oct 2024 21:00  Patient On (Oxygen Delivery Method): nasal cannula w/ humidification  O2 Flow (L/min): 4        Adult Advanced Hemodynamics Last 24 Hrs  CVP(mm Hg): 2 (16 Oct 2024 21:00) (2 - 13)  CVP(cm H2O): --  CO: --  CI: --  PA: --  PA(mean): --  PCWP: --  SVR: --  SVRI: --  PVR: --  PVRI: --, ABG - ( 16 Oct 2024 18:16 )  pH, Arterial: 7.39  pH, Blood: x     /  pCO2: 36    /  pO2: 105   / HCO3: 22    / Base Excess: -2.6  /  SaO2: 99.5              Mode: AC/ CMV (Assist Control/ Continuous Mandatory Ventilation)  RR (machine): 14  TV (machine): 500  FiO2: 70  PEEP: 7  ITime: 1  MAP: 9.8  PIP: 18    Intake and output was reviewed and the fluid balance was calculated  Daily Height in cm: 167.64 (16 Oct 2024 06:57)    Daily   I&O's Summary    15 Oct 2024 07:01  -  16 Oct 2024 07:00  --------------------------------------------------------  IN: 1020 mL / OUT: 275 mL / NET: 745 mL    16 Oct 2024 07:01  -  16 Oct 2024 21:19  --------------------------------------------------------  IN: 1052.5 mL / OUT: 1315 mL / NET: -262.5 mL        All lines and drain sites were assessed      Neuro: No focal motor deficit.  Neck: No JVD.  CVS: S1, S2, No S3.  Lungs: Good air entry bilaterally.  Abd: Soft. No tenderness. + Bowel sounds.  Vascular: + DP/PT.  Extremities: No edema.  Lymphatic: Normal.  Skin: No abnormalities.      labs  CBC Full  -  ( 16 Oct 2024 17:53 )  WBC Count : 11.50 K/uL  RBC Count : 3.80 M/uL  Hemoglobin : 11.1 g/dL  Hematocrit : 32.7 %  Platelet Count - Automated : 125 K/uL  Mean Cell Volume : 86.1 fl  Mean Cell Hemoglobin : 29.2 pg  Mean Cell Hemoglobin Concentration : 33.9 gm/dL  Auto Neutrophil # : 9.76 K/uL  Auto Lymphocyte # : 0.79 K/uL  Auto Monocyte # : 0.87 K/uL  Auto Eosinophil # : 0.01 K/uL  Auto Basophil # : 0.02 K/uL  Auto Neutrophil % : 84.8 %  Auto Lymphocyte % : 6.9 %  Auto Monocyte % : 7.6 %  Auto Eosinophil % : 0.1 %  Auto Basophil % : 0.2 %    10-16    136  |  106  |  8   ----------------------------<  140[H]  4.7   |  24  |  0.79    Ca    8.1[L]      16 Oct 2024 17:53  Phos  2.7     10-16  Mg     2.2     10-16    TPro  5.1[L]  /  Alb  3.5  /  TBili  0.8  /  DBili  x   /  AST  64[H]  /  ALT  14  /  AlkPhos  39[L]  10-16    PT/INR - ( 16 Oct 2024 17:53 )   PT: 13.6 sec;   INR: 1.16          PTT - ( 16 Oct 2024 17:53 )  PTT:48.4 sec  The current medications were reviewed   MEDICATIONS  (STANDING):  acetaminophen   IVPB .. 1000 milliGRAM(s) IV Intermittent every 6 hours  ascorbic acid 500 milliGRAM(s) Oral daily  aspirin enteric coated 81 milliGRAM(s) Oral daily  ceFAZolin   IVPB 2000 milliGRAM(s) IV Intermittent every 8 hours  chlorhexidine 0.12% Liquid 15 milliLiter(s) Oral Mucosa every 12 hours  chlorhexidine 2% Cloths 1 Application(s) Topical daily  dextrose 50% Injectable 50 milliLiter(s) IV Push every 15 minutes  dextrose 50% Injectable 25 milliLiter(s) IV Push every 15 minutes  gabapentin 100 milliGRAM(s) Oral three times a day  heparin   Injectable 5000 Unit(s) SubCutaneous every 8 hours  influenza   Vaccine 0.5 milliLiter(s) IntraMuscular once  insulin regular Infusion 1 Unit(s)/Hr (1 mL/Hr) IV Continuous <Continuous>  mupirocin 2% Nasal 1 Application(s) Both Nostrils two times a day  norepinephrine Infusion 0.05 MICROgram(s)/kG/Min (7.53 mL/Hr) IV Continuous <Continuous>  pantoprazole    Tablet 40 milliGRAM(s) Oral before breakfast  polyethylene glycol 3350 17 Gram(s) Oral daily  rosuvastatin 40 milliGRAM(s) Oral at bedtime  senna 2 Tablet(s) Oral at bedtime  sodium chloride 0.9%. 1000 milliLiter(s) (10 mL/Hr) IV Continuous <Continuous>    MEDICATIONS  (PRN):  oxyCODONE    IR 5 milliGRAM(s) Oral every 6 hours PRN Moderate Pain (4 - 6)  oxyCODONE    IR 10 milliGRAM(s) Oral every 6 hours PRN Severe Pain (7 - 10)              59 year old  Male admitted with triple vessel CAD   S/P CABG  Hemodynamically stable.  Good oxygenation.  Fair urine out put.        My plan includes :  D/C norepinephrine.   Statin and Betablocker.  Close hemodynamic monitoring   Monitor for arrhythmias and monitor parameters for organ perfusion  Monitor neurologic status  Monitor renal function.  Head of the bed should remain elevated to 45 deg .   Chest PT and IS will be encouraged  Monitor adequacy of oxygenation a  Nutritional goals will be met using po eventually , ensure adequate caloric intake and monitor the same  Stress ulcer and VTE prophylaxis will be achieved    Glycemic control is satisfactory  Electrolytes have been repleted as necessary and wound care has been carried out. Pain control has been achieved.   Aggressive physical therapy and early mobility and ambulation goals will be met   The family was updated about the course and plan  CRITICAL CARE TIME SPENT in evaluation and management, reassessments, review and interpretation of labs and x-rays, hemodynamic management, formulating a plan and coordinating care: ___40____ MIN.  Time does not include procedural time.  CTICU ATTENDING     					    Aries Hung MD

## 2024-10-16 NOTE — BRIEF OPERATIVE NOTE - NSICDXBRIEFPROCEDURE_GEN_ALL_CORE_FT
PROCEDURES:  CABG, with PIERCE 16-Oct-2024 14:34:33 CABGx4 (LIMA-LAD, RA-OM, SVG-PDA, SVG-Diag) Eileen Sanches

## 2024-10-16 NOTE — BRIEF OPERATIVE NOTE - OPERATION/FINDINGS
CAD, EF 55%    RA [place] Time: RA Prep Time:  SVG [place] Time: SVG Prep Time: CAD, EF 55%  CABGx4 (LIMA-LAD, RA-OM, SVG-PDA, SVG-Diag)  RA [place] Time: 45 minutes RA Prep Time: 10 minutes   SVG [place] Time: 30 minutes SVG Prep Time: 10 minutes

## 2024-10-17 LAB
ALBUMIN SERPL ELPH-MCNC: 2.9 G/DL — LOW (ref 3.3–5)
ALBUMIN SERPL ELPH-MCNC: 3.3 G/DL — SIGNIFICANT CHANGE UP (ref 3.3–5)
ALP SERPL-CCNC: 37 U/L — LOW (ref 40–120)
ALP SERPL-CCNC: 39 U/L — LOW (ref 40–120)
ALT FLD-CCNC: 13 U/L — SIGNIFICANT CHANGE UP (ref 10–45)
ALT FLD-CCNC: 14 U/L — SIGNIFICANT CHANGE UP (ref 10–45)
ANION GAP SERPL CALC-SCNC: 10 MMOL/L — SIGNIFICANT CHANGE UP (ref 5–17)
ANION GAP SERPL CALC-SCNC: 6 MMOL/L — SIGNIFICANT CHANGE UP (ref 5–17)
APTT BLD: 27.8 SEC — SIGNIFICANT CHANGE UP (ref 24.5–35.6)
APTT BLD: 30.2 SEC — SIGNIFICANT CHANGE UP (ref 24.5–35.6)
AST SERPL-CCNC: 49 U/L — HIGH (ref 10–40)
AST SERPL-CCNC: 61 U/L — HIGH (ref 10–40)
BASOPHILS # BLD AUTO: 0.01 K/UL — SIGNIFICANT CHANGE UP (ref 0–0.2)
BASOPHILS # BLD AUTO: 0.01 K/UL — SIGNIFICANT CHANGE UP (ref 0–0.2)
BASOPHILS NFR BLD AUTO: 0.1 % — SIGNIFICANT CHANGE UP (ref 0–2)
BASOPHILS NFR BLD AUTO: 0.1 % — SIGNIFICANT CHANGE UP (ref 0–2)
BILIRUB SERPL-MCNC: 0.5 MG/DL — SIGNIFICANT CHANGE UP (ref 0.2–1.2)
BILIRUB SERPL-MCNC: 0.6 MG/DL — SIGNIFICANT CHANGE UP (ref 0.2–1.2)
BUN SERPL-MCNC: 10 MG/DL — SIGNIFICANT CHANGE UP (ref 7–23)
BUN SERPL-MCNC: 11 MG/DL — SIGNIFICANT CHANGE UP (ref 7–23)
CALCIUM SERPL-MCNC: 7.6 MG/DL — LOW (ref 8.4–10.5)
CALCIUM SERPL-MCNC: 7.9 MG/DL — LOW (ref 8.4–10.5)
CHLORIDE SERPL-SCNC: 103 MMOL/L — SIGNIFICANT CHANGE UP (ref 96–108)
CHLORIDE SERPL-SCNC: 99 MMOL/L — SIGNIFICANT CHANGE UP (ref 96–108)
CO2 SERPL-SCNC: 24 MMOL/L — SIGNIFICANT CHANGE UP (ref 22–31)
CO2 SERPL-SCNC: 24 MMOL/L — SIGNIFICANT CHANGE UP (ref 22–31)
CREAT SERPL-MCNC: 0.85 MG/DL — SIGNIFICANT CHANGE UP (ref 0.5–1.3)
CREAT SERPL-MCNC: 0.97 MG/DL — SIGNIFICANT CHANGE UP (ref 0.5–1.3)
EGFR: 89 ML/MIN/1.73M2 — SIGNIFICANT CHANGE UP
EGFR: 99 ML/MIN/1.73M2 — SIGNIFICANT CHANGE UP
EOSINOPHIL # BLD AUTO: 0 K/UL — SIGNIFICANT CHANGE UP (ref 0–0.5)
EOSINOPHIL # BLD AUTO: 0 K/UL — SIGNIFICANT CHANGE UP (ref 0–0.5)
EOSINOPHIL NFR BLD AUTO: 0 % — SIGNIFICANT CHANGE UP (ref 0–6)
EOSINOPHIL NFR BLD AUTO: 0 % — SIGNIFICANT CHANGE UP (ref 0–6)
GAS PNL BLDA: SIGNIFICANT CHANGE UP
GAS PNL BLDA: SIGNIFICANT CHANGE UP
GLUCOSE BLDC GLUCOMTR-MCNC: 130 MG/DL — HIGH (ref 70–99)
GLUCOSE BLDC GLUCOMTR-MCNC: 137 MG/DL — HIGH (ref 70–99)
GLUCOSE BLDC GLUCOMTR-MCNC: 138 MG/DL — HIGH (ref 70–99)
GLUCOSE SERPL-MCNC: 134 MG/DL — HIGH (ref 70–99)
GLUCOSE SERPL-MCNC: 141 MG/DL — HIGH (ref 70–99)
HCT VFR BLD CALC: 28.2 % — LOW (ref 39–50)
HCT VFR BLD CALC: 29.8 % — LOW (ref 39–50)
HGB BLD-MCNC: 10 G/DL — LOW (ref 13–17)
HGB BLD-MCNC: 9.4 G/DL — LOW (ref 13–17)
IMM GRANULOCYTES NFR BLD AUTO: 0.4 % — SIGNIFICANT CHANGE UP (ref 0–0.9)
IMM GRANULOCYTES NFR BLD AUTO: 0.4 % — SIGNIFICANT CHANGE UP (ref 0–0.9)
INR BLD: 1.19 — HIGH (ref 0.85–1.16)
INR BLD: 1.33 — HIGH (ref 0.85–1.16)
ISTAT ARTERIAL BE: -2 MMOL/L — SIGNIFICANT CHANGE UP (ref -2–3)
ISTAT ARTERIAL GLUCOSE: 100 MG/DL — HIGH (ref 70–99)
ISTAT ARTERIAL HCO3: 24 MMOL/L — SIGNIFICANT CHANGE UP (ref 22–26)
ISTAT ARTERIAL HEMATOCRIT: 28 % — LOW (ref 39–50)
ISTAT ARTERIAL HEMOGLOBIN: 9.5 G/DL — LOW (ref 13–17)
ISTAT ARTERIAL IONIZED CALCIUM: 1.13 MMOL/L — SIGNIFICANT CHANGE UP (ref 1.12–1.3)
ISTAT ARTERIAL PCO2: 43 MMHG — SIGNIFICANT CHANGE UP (ref 35–45)
ISTAT ARTERIAL PH: 7.35 — SIGNIFICANT CHANGE UP (ref 7.35–7.45)
ISTAT ARTERIAL PO2: 89 MMHG — SIGNIFICANT CHANGE UP (ref 80–105)
ISTAT ARTERIAL POTASSIUM: 3.9 MMOL/L — SIGNIFICANT CHANGE UP (ref 3.5–5.3)
ISTAT ARTERIAL SO2: 96 % — SIGNIFICANT CHANGE UP (ref 95–98)
ISTAT ARTERIAL SODIUM: 140 MMOL/L — SIGNIFICANT CHANGE UP (ref 135–145)
ISTAT ARTERIAL TCO2: 25 MMOL/L — SIGNIFICANT CHANGE UP (ref 22–31)
LYMPHOCYTES # BLD AUTO: 0.87 K/UL — LOW (ref 1–3.3)
LYMPHOCYTES # BLD AUTO: 1.18 K/UL — SIGNIFICANT CHANGE UP (ref 1–3.3)
LYMPHOCYTES # BLD AUTO: 10.5 % — LOW (ref 13–44)
LYMPHOCYTES # BLD AUTO: 8 % — LOW (ref 13–44)
MAGNESIUM SERPL-MCNC: 1.9 MG/DL — SIGNIFICANT CHANGE UP (ref 1.6–2.6)
MAGNESIUM SERPL-MCNC: 2.4 MG/DL — SIGNIFICANT CHANGE UP (ref 1.6–2.6)
MCHC RBC-ENTMCNC: 28.2 PG — SIGNIFICANT CHANGE UP (ref 27–34)
MCHC RBC-ENTMCNC: 28.3 PG — SIGNIFICANT CHANGE UP (ref 27–34)
MCHC RBC-ENTMCNC: 33.3 GM/DL — SIGNIFICANT CHANGE UP (ref 32–36)
MCHC RBC-ENTMCNC: 33.6 GM/DL — SIGNIFICANT CHANGE UP (ref 32–36)
MCV RBC AUTO: 84.4 FL — SIGNIFICANT CHANGE UP (ref 80–100)
MCV RBC AUTO: 84.7 FL — SIGNIFICANT CHANGE UP (ref 80–100)
MONOCYTES # BLD AUTO: 1.13 K/UL — HIGH (ref 0–0.9)
MONOCYTES # BLD AUTO: 1.36 K/UL — HIGH (ref 0–0.9)
MONOCYTES NFR BLD AUTO: 10.4 % — SIGNIFICANT CHANGE UP (ref 2–14)
MONOCYTES NFR BLD AUTO: 12.1 % — SIGNIFICANT CHANGE UP (ref 2–14)
NEUTROPHILS # BLD AUTO: 8.62 K/UL — HIGH (ref 1.8–7.4)
NEUTROPHILS # BLD AUTO: 8.83 K/UL — HIGH (ref 1.8–7.4)
NEUTROPHILS NFR BLD AUTO: 76.9 % — SIGNIFICANT CHANGE UP (ref 43–77)
NEUTROPHILS NFR BLD AUTO: 81.1 % — HIGH (ref 43–77)
NRBC # BLD: 0 /100 WBCS — SIGNIFICANT CHANGE UP (ref 0–0)
NRBC # BLD: 0 /100 WBCS — SIGNIFICANT CHANGE UP (ref 0–0)
PHOSPHATE SERPL-MCNC: 3 MG/DL — SIGNIFICANT CHANGE UP (ref 2.5–4.5)
PHOSPHATE SERPL-MCNC: 3.6 MG/DL — SIGNIFICANT CHANGE UP (ref 2.5–4.5)
PLATELET # BLD AUTO: 147 K/UL — LOW (ref 150–400)
PLATELET # BLD AUTO: 147 K/UL — LOW (ref 150–400)
POTASSIUM SERPL-MCNC: 4.2 MMOL/L — SIGNIFICANT CHANGE UP (ref 3.5–5.3)
POTASSIUM SERPL-MCNC: 4.3 MMOL/L — SIGNIFICANT CHANGE UP (ref 3.5–5.3)
POTASSIUM SERPL-SCNC: 4.2 MMOL/L — SIGNIFICANT CHANGE UP (ref 3.5–5.3)
POTASSIUM SERPL-SCNC: 4.3 MMOL/L — SIGNIFICANT CHANGE UP (ref 3.5–5.3)
PROT SERPL-MCNC: 4.8 G/DL — LOW (ref 6–8.3)
PROT SERPL-MCNC: 5.1 G/DL — LOW (ref 6–8.3)
PROTHROM AB SERPL-ACNC: 13.9 SEC — HIGH (ref 9.9–13.4)
PROTHROM AB SERPL-ACNC: 15.2 SEC — HIGH (ref 9.9–13.4)
RBC # BLD: 3.33 M/UL — LOW (ref 4.2–5.8)
RBC # BLD: 3.53 M/UL — LOW (ref 4.2–5.8)
RBC # FLD: 13 % — SIGNIFICANT CHANGE UP (ref 10.3–14.5)
RBC # FLD: 13.2 % — SIGNIFICANT CHANGE UP (ref 10.3–14.5)
SODIUM SERPL-SCNC: 133 MMOL/L — LOW (ref 135–145)
SODIUM SERPL-SCNC: 133 MMOL/L — LOW (ref 135–145)
WBC # BLD: 10.88 K/UL — HIGH (ref 3.8–10.5)
WBC # BLD: 11.21 K/UL — HIGH (ref 3.8–10.5)
WBC # FLD AUTO: 10.88 K/UL — HIGH (ref 3.8–10.5)
WBC # FLD AUTO: 11.21 K/UL — HIGH (ref 3.8–10.5)

## 2024-10-17 PROCEDURE — 99291 CRITICAL CARE FIRST HOUR: CPT

## 2024-10-17 PROCEDURE — 99292 CRITICAL CARE ADDL 30 MIN: CPT

## 2024-10-17 PROCEDURE — 71045 X-RAY EXAM CHEST 1 VIEW: CPT | Mod: 26

## 2024-10-17 RX ORDER — INSULIN LISPRO 100/ML
VIAL (ML) SUBCUTANEOUS AT BEDTIME
Refills: 0 | Status: DISCONTINUED | OUTPATIENT
Start: 2024-10-17 | End: 2024-10-22

## 2024-10-17 RX ORDER — GLUCAGON INJECTION, SOLUTION 1 MG/.2ML
1 INJECTION, SOLUTION SUBCUTANEOUS ONCE
Refills: 0 | Status: DISCONTINUED | OUTPATIENT
Start: 2024-10-17 | End: 2024-10-22

## 2024-10-17 RX ORDER — FUROSEMIDE 40 MG
20 TABLET ORAL ONCE
Refills: 0 | Status: COMPLETED | OUTPATIENT
Start: 2024-10-17 | End: 2024-10-17

## 2024-10-17 RX ORDER — CALCIUM GLUCONATE 98 MG/ML
2 INJECTION, SOLUTION INTRAVENOUS ONCE
Refills: 0 | Status: COMPLETED | OUTPATIENT
Start: 2024-10-17 | End: 2024-10-17

## 2024-10-17 RX ORDER — MIDODRINE HYDROCHLORIDE 2.5 MG/1
5 TABLET ORAL ONCE
Refills: 0 | Status: COMPLETED | OUTPATIENT
Start: 2024-10-17 | End: 2024-10-17

## 2024-10-17 RX ORDER — POTASSIUM CHLORIDE 10 MEQ
20 TABLET, EXTENDED RELEASE ORAL ONCE
Refills: 0 | Status: COMPLETED | OUTPATIENT
Start: 2024-10-17 | End: 2024-10-17

## 2024-10-17 RX ORDER — INSULIN LISPRO 100/ML
VIAL (ML) SUBCUTANEOUS
Refills: 0 | Status: DISCONTINUED | OUTPATIENT
Start: 2024-10-17 | End: 2024-10-22

## 2024-10-17 RX ORDER — MAGNESIUM SULFATE IN 0.9% NACL 2 G/50 ML
2 INTRAVENOUS SOLUTION, PIGGYBACK (ML) INTRAVENOUS ONCE
Refills: 0 | Status: COMPLETED | OUTPATIENT
Start: 2024-10-17 | End: 2024-10-17

## 2024-10-17 RX ORDER — ALBUMIN HUMAN 50 G/1000ML
250 SOLUTION INTRAVENOUS ONCE
Refills: 0 | Status: COMPLETED | OUTPATIENT
Start: 2024-10-17 | End: 2024-10-17

## 2024-10-17 RX ADMIN — Medication 1000 MILLIGRAM(S): at 00:00

## 2024-10-17 RX ADMIN — Medication 400 MILLIGRAM(S): at 11:11

## 2024-10-17 RX ADMIN — OXYCODONE HYDROCHLORIDE 10 MILLIGRAM(S): 30 TABLET ORAL at 11:31

## 2024-10-17 RX ADMIN — Medication 81 MILLIGRAM(S): at 11:20

## 2024-10-17 RX ADMIN — HEPARIN SODIUM 5000 UNIT(S): 10000 INJECTION INTRAVENOUS; SUBCUTANEOUS at 21:35

## 2024-10-17 RX ADMIN — Medication 100 MILLIGRAM(S): at 11:21

## 2024-10-17 RX ADMIN — Medication 2 TABLET(S): at 21:35

## 2024-10-17 RX ADMIN — Medication 1000 MILLIGRAM(S): at 11:15

## 2024-10-17 RX ADMIN — OXYCODONE HYDROCHLORIDE 10 MILLIGRAM(S): 30 TABLET ORAL at 11:20

## 2024-10-17 RX ADMIN — Medication 100 MILLIGRAM(S): at 19:33

## 2024-10-17 RX ADMIN — MUPIROCIN 1 APPLICATION(S): 20 OINTMENT TOPICAL at 19:33

## 2024-10-17 RX ADMIN — Medication 100 MILLIEQUIVALENT(S): at 06:27

## 2024-10-17 RX ADMIN — HEPARIN SODIUM 5000 UNIT(S): 10000 INJECTION INTRAVENOUS; SUBCUTANEOUS at 06:14

## 2024-10-17 RX ADMIN — Medication 100 MILLIGRAM(S): at 03:13

## 2024-10-17 RX ADMIN — OXYCODONE HYDROCHLORIDE 10 MILLIGRAM(S): 30 TABLET ORAL at 02:50

## 2024-10-17 RX ADMIN — MUPIROCIN 1 APPLICATION(S): 20 OINTMENT TOPICAL at 06:14

## 2024-10-17 RX ADMIN — HEPARIN SODIUM 5000 UNIT(S): 10000 INJECTION INTRAVENOUS; SUBCUTANEOUS at 13:58

## 2024-10-17 RX ADMIN — Medication 25 GRAM(S): at 05:00

## 2024-10-17 RX ADMIN — Medication 400 MILLIGRAM(S): at 06:14

## 2024-10-17 RX ADMIN — Medication 80 MILLIGRAM(S): at 21:35

## 2024-10-17 RX ADMIN — Medication 20 MILLIGRAM(S): at 06:15

## 2024-10-17 RX ADMIN — OXYCODONE HYDROCHLORIDE 10 MILLIGRAM(S): 30 TABLET ORAL at 02:05

## 2024-10-17 RX ADMIN — ALBUMIN HUMAN 125 MILLILITER(S): 50 SOLUTION INTRAVENOUS at 08:46

## 2024-10-17 RX ADMIN — PANTOPRAZOLE SODIUM 40 MILLIGRAM(S): 40 TABLET, DELAYED RELEASE ORAL at 06:14

## 2024-10-17 RX ADMIN — POLYETHYLENE GLYCOL 3350 17 GRAM(S): 17 POWDER, FOR SOLUTION ORAL at 11:21

## 2024-10-17 RX ADMIN — GABAPENTIN 100 MILLIGRAM(S): 300 CAPSULE ORAL at 13:58

## 2024-10-17 RX ADMIN — Medication 500 MILLIGRAM(S): at 11:21

## 2024-10-17 RX ADMIN — CHLORHEXIDINE GLUCONATE 15 MILLILITER(S): 40 SOLUTION TOPICAL at 06:14

## 2024-10-17 RX ADMIN — GABAPENTIN 100 MILLIGRAM(S): 300 CAPSULE ORAL at 21:35

## 2024-10-17 RX ADMIN — GABAPENTIN 100 MILLIGRAM(S): 300 CAPSULE ORAL at 06:14

## 2024-10-17 RX ADMIN — CALCIUM GLUCONATE 200 GRAM(S): 98 INJECTION, SOLUTION INTRAVENOUS at 04:30

## 2024-10-17 RX ADMIN — Medication 1000 MILLIGRAM(S): at 06:45

## 2024-10-17 RX ADMIN — CHLORHEXIDINE GLUCONATE 1 APPLICATION(S): 40 SOLUTION TOPICAL at 13:58

## 2024-10-17 NOTE — DIETITIAN INITIAL EVALUATION ADULT - OTHER INFO
59M with PMH of HTN, HLD, pre DM, fam hx of CAD who c/o STONER. Denies SOB at rest. Denies Chest pain. Patient had an abnormal stress test in 7/2024 and underwent a CCTA on 10/7/24 that was suspicious for CAD (findings below). On 10/10/24 he underwent a LHC at [facility] with Dr. Jackson which revealed 3v CAD (LM diffuse disease, moderate distal, LAD moderate diffuse prox, severe mid stenosis, severe diag stenosis, LCx: 70% OM stenosis, diffuse disease, RCA 80% mid distal stenosis, right dominant coronary circulation) & patient was referred to Dr. Jacob for surgical evaluation. He was admitted to [facility] on 10/14/24 for medical optimization prior to OR for CABG on 10/16/24.     Patient seen for nutrition assessment. Received OOB in chair on nasal cannula, MAP 81, /62, afebrile. No drips at present. Labs and medications reviewed. Na 133<L>, glucose  x 24 hours, HgbA1c 5.6% (10/14/24). Ordered for abx, insulin sliding scale, vitamin C, protonix, miralax, senna. Skin: No pressure injuries documented, noted with +1 generalized edema, surgical incision to midline sternum, L leg graft site, L arm radial graft site. GI: no report of GI distress, last BM 10/16 per flowsheets. Current diet order: DASH/TLC. Confirms no known food allergies. No difficulty chewing/swallowing reported. Reports good appetite s/p OR, consumed 100% of breakfast and lunch today. PTA, pt endorses good appetite and intake at baseline. Dosing weight: 177 pounds, BMI 28.6, reports UBW of 175 pounds and denies recent weight loss. Observed with no overt signs and symptoms of muscle or fat wasting. Based on ASPEN guidelines, pt does not meet criteria for malnutrition. Nutrition education provided. Encouraged adequate PO intake with emphasis on protein for post-op healing, reviewed sources of protein. Discussed heart healthy diet. Pt expressed appreciation and understanding. See nutrition recommendations below.

## 2024-10-17 NOTE — PHYSICAL THERAPY INITIAL EVALUATION ADULT - GENERAL OBSERVATIONS, REHAB EVAL
PT IE completed. Patient received seated in bedside chair +tele +6LO2 via NC, +central line, +A-line, +sternal dressing C/D/I, +external pacer, +rios, NAD, willing to work with PT.

## 2024-10-17 NOTE — DIETITIAN INITIAL EVALUATION ADULT - PERTINENT LABORATORY DATA
10-17    133[L]  |  99  |  11  ----------------------------<  141[H]  4.3   |  24  |  0.97    Ca    7.9[L]      17 Oct 2024 12:08  Phos  3.0     10-17  Mg     2.4     10-17    TPro  5.1[L]  /  Alb  3.3  /  TBili  0.5  /  DBili  x   /  AST  49[H]  /  ALT  13  /  AlkPhos  39[L]  10-17  POCT Blood Glucose.: 130 mg/dL (10-17-24 @ 12:00)  A1C with Estimated Average Glucose Result: 5.6 % (10-14-24 @ 14:33)

## 2024-10-17 NOTE — DIETITIAN INITIAL EVALUATION ADULT - [id number]_GEN_A_CORE
10-16-24 @ 07:01  -  10-17-24 @ 07:00  --------------------------------------------------------  OUT:    Chest Tube (mL): 350 mL    Chest Tube (mL): 120 mL  Total OUT: 470 mL    Total NET: -470 mL      10-17-24 @ 07:01  -  10-17-24 @ 14:42  --------------------------------------------------------  OUT:    Chest Tube (mL): 70 mL  Total OUT: 70 mL    Total NET: -70 mL

## 2024-10-17 NOTE — DIETITIAN INITIAL EVALUATION ADULT - ADD RECOMMEND
1. Continue DASH/TLC diet   2. Encourage and monitor PO intake, honor preferences as able    3. Monitor labs, wt trends, GI function, and skin integrity   4. Pain and bowel regimen per team   5. RD to remain available for additional nutrition interventions and diet edu as needed

## 2024-10-17 NOTE — DIETITIAN INITIAL EVALUATION ADULT - PERTINENT MEDS FT
MEDICATIONS  (STANDING):  acetaminophen     Tablet .. 1000 milliGRAM(s) Oral every 6 hours  ascorbic acid 500 milliGRAM(s) Oral daily  aspirin enteric coated 81 milliGRAM(s) Oral daily  atorvastatin 80 milliGRAM(s) Oral at bedtime  ceFAZolin   IVPB 2000 milliGRAM(s) IV Intermittent every 8 hours  chlorhexidine 0.12% Liquid 15 milliLiter(s) Oral Mucosa every 12 hours  chlorhexidine 2% Cloths 1 Application(s) Topical daily  dextrose 5%. 1000 milliLiter(s) (50 mL/Hr) IV Continuous <Continuous>  dextrose 5%. 1000 milliLiter(s) (100 mL/Hr) IV Continuous <Continuous>  dextrose 50% Injectable 25 milliLiter(s) IV Push every 15 minutes  dextrose 50% Injectable 50 milliLiter(s) IV Push every 15 minutes  gabapentin 100 milliGRAM(s) Oral three times a day  glucagon  Injectable 1 milliGRAM(s) IntraMuscular once  heparin   Injectable 5000 Unit(s) SubCutaneous every 8 hours  influenza   Vaccine 0.5 milliLiter(s) IntraMuscular once  insulin lispro (ADMELOG) corrective regimen sliding scale   SubCutaneous at bedtime  insulin lispro (ADMELOG) corrective regimen sliding scale   SubCutaneous three times a day before meals  mupirocin 2% Nasal 1 Application(s) Both Nostrils two times a day  pantoprazole    Tablet 40 milliGRAM(s) Oral before breakfast  polyethylene glycol 3350 17 Gram(s) Oral daily  senna 2 Tablet(s) Oral at bedtime  sodium chloride 0.9%. 1000 milliLiter(s) (10 mL/Hr) IV Continuous <Continuous>    MEDICATIONS  (PRN):  dextrose Oral Gel 15 Gram(s) Oral once PRN Blood Glucose LESS THAN 70 milliGRAM(s)/deciliter  oxyCODONE    IR 5 milliGRAM(s) Oral every 6 hours PRN Moderate Pain (4 - 6)  oxyCODONE    IR 10 milliGRAM(s) Oral every 6 hours PRN Severe Pain (7 - 10)

## 2024-10-17 NOTE — PROGRESS NOTE ADULT - SUBJECTIVE AND OBJECTIVE BOX
CTICU  CRITICAL  CARE  attending     Hand off received 					   Pertinent clinical, laboratory, radiographic, hemodynamic, echocardiographic, respiratory data, microbiologic data and chart were reviewed and analyzed frequently throughout the course of the day and night  Patient seen and examined with CTS/ SH attending at bedside  Pt is a 59y , Male, HEALTH ISSUES - PROBLEM Dx:      , FAMILY HISTORY:  PAST MEDICAL & SURGICAL HISTORY:  CAD (coronary artery disease)      H/O right inguinal hernia repair      H/O left inguinal hernia repair      H/O right knee surgery        Patient is a 59y old  Male who presents with a chief complaint of CAD (17 Oct 2024 21:55)      14 system review was unremarkable    Vital signs, hemodynamic and respiratory parameters were reviewed from the bedside nursing flowsheet.  ICU Vital Signs Last 24 Hrs  T(C): 37.2 (17 Oct 2024 21:22), Max: 37.2 (17 Oct 2024 21:22)  T(F): 98.9 (17 Oct 2024 21:22), Max: 98.9 (17 Oct 2024 21:22)  HR: 94 (17 Oct 2024 23:00) (64 - 97)  BP: 101/56 (17 Oct 2024 23:00) (92/56 - 126/68)  BP(mean): 73 (17 Oct 2024 23:00) (68 - 90)  ABP: 100/54 (17 Oct 2024 15:00) (100/54 - 125/59)  ABP(mean): 70 (17 Oct 2024 15:00) (65 - 80)  RR: 18 (17 Oct 2024 23:00) (12 - 20)  SpO2: 93% (17 Oct 2024 23:00) (91% - 98%)    O2 Parameters below as of 17 Oct 2024 23:00  Patient On (Oxygen Delivery Method): nasal cannula w/ humidification  O2 Flow (L/min): 4        Adult Advanced Hemodynamics Last 24 Hrs  CVP(mm Hg): 17 (17 Oct 2024 23:00) (2 - 305)  CVP(cm H2O): --  CO: --  CI: --  PA: --  PA(mean): --  PCWP: --  SVR: --  SVRI: --  PVR: --  PVRI: --, ABG - ( 17 Oct 2024 12:17 )  pH, Arterial: 7.42  pH, Blood: x     /  pCO2: 33    /  pO2: 79    / HCO3: 21    / Base Excess: -2.3  /  SaO2: 99.4                Intake and output was reviewed and the fluid balance was calculated  Daily     Daily   I&O's Summary    16 Oct 2024 07:01  -  17 Oct 2024 07:00  --------------------------------------------------------  IN: 2023 mL / OUT: 2260 mL / NET: -237 mL    17 Oct 2024 07:01  -  17 Oct 2024 23:11  --------------------------------------------------------  IN: 704.5 mL / OUT: 1605 mL / NET: -900.5 mL        All lines and drain sites were assessed  [facility]      POCT Blood Glucose.: 137 mg/dL (17 Oct 2024 21:34)    No acute change in mental status  Auscultation of the chest reveals equal bs  Abdomen is soft  Extremities are warm and well perfused  Wounds appear clean and unremarkable  Antibiotics are periop    labs  CBC Full  -  ( 17 Oct 2024 12:08 )  WBC Count : 11.21 K/uL  RBC Count : 3.33 M/uL  Hemoglobin : 9.4 g/dL  Hematocrit : 28.2 %  Platelet Count - Automated : 147 K/uL  Mean Cell Volume : 84.7 fl  Mean Cell Hemoglobin : 28.2 pg  Mean Cell Hemoglobin Concentration : 33.3 gm/dL  Auto Neutrophil # : 8.62 K/uL  Auto Lymphocyte # : 1.18 K/uL  Auto Monocyte # : 1.36 K/uL  Auto Eosinophil # : 0.00 K/uL  Auto Basophil # : 0.01 K/uL  Auto Neutrophil % : 76.9 %  Auto Lymphocyte % : 10.5 %  Auto Monocyte % : 12.1 %  Auto Eosinophil % : 0.0 %  Auto Basophil % : 0.1 %    10-17    133[L]  |  99  |  11  ----------------------------<  141[H]  4.3   |  24  |  0.97    Ca    7.9[L]      17 Oct 2024 12:08  Phos  3.0     10-17  Mg     2.4     10-17    TPro  5.1[L]  /  Alb  3.3  /  TBili  0.5  /  DBili  x   /  AST  49[H]  /  ALT  13  /  AlkPhos  39[L]  10-17    PT/INR - ( 17 Oct 2024 12:08 )   PT: 15.2 sec;   INR: 1.33          PTT - ( 17 Oct 2024 12:08 )  PTT:27.8 sec  The current medications were reviewed   MEDICATIONS  (STANDING):  acetaminophen     Tablet .. 1000 milliGRAM(s) Oral every 6 hours  ascorbic acid 500 milliGRAM(s) Oral daily  aspirin enteric coated 81 milliGRAM(s) Oral daily  atorvastatin 80 milliGRAM(s) Oral at bedtime  ceFAZolin   IVPB 2000 milliGRAM(s) IV Intermittent every 8 hours  chlorhexidine 0.12% Liquid 15 milliLiter(s) Oral Mucosa every 12 hours  chlorhexidine 2% Cloths 1 Application(s) Topical daily  dextrose 5%. 1000 milliLiter(s) (50 mL/Hr) IV Continuous <Continuous>  dextrose 5%. 1000 milliLiter(s) (100 mL/Hr) IV Continuous <Continuous>  dextrose 50% Injectable 25 milliLiter(s) IV Push every 15 minutes  dextrose 50% Injectable 50 milliLiter(s) IV Push every 15 minutes  gabapentin 100 milliGRAM(s) Oral three times a day  glucagon  Injectable 1 milliGRAM(s) IntraMuscular once  heparin   Injectable 5000 Unit(s) SubCutaneous every 8 hours  influenza   Vaccine 0.5 milliLiter(s) IntraMuscular once  insulin lispro (ADMELOG) corrective regimen sliding scale   SubCutaneous three times a day before meals  insulin lispro (ADMELOG) corrective regimen sliding scale   SubCutaneous at bedtime  mupirocin 2% Nasal 1 Application(s) Both Nostrils two times a day  pantoprazole    Tablet 40 milliGRAM(s) Oral before breakfast  polyethylene glycol 3350 17 Gram(s) Oral daily  senna 2 Tablet(s) Oral at bedtime  sodium chloride 0.9%. 1000 milliLiter(s) (10 mL/Hr) IV Continuous <Continuous>    MEDICATIONS  (PRN):  dextrose Oral Gel 15 Gram(s) Oral once PRN Blood Glucose LESS THAN 70 milliGRAM(s)/deciliter  oxyCODONE    IR 5 milliGRAM(s) Oral every 6 hours PRN Moderate Pain (4 - 6)  oxyCODONE    IR 10 milliGRAM(s) Oral every 6 hours PRN Severe Pain (7 - 10)       PROBLEM LIST/ ASSESSMENT:  HEALTH ISSUES - PROBLEM Dx:      ,   Patient is a 59y old  Male who presents with a chief complaint of CAD (17 Oct 2024 21:55)     s/p cardiac surgery    expected post - op Hypovolemic shock - > 20% intravascular depletion will replete volume            My plan includes :    albumin for vol    dc pleural ct and art line   close hemodynamic, ventilatory and drain monitoring and management per post op routine    Monitor for arrhythmias and monitor parameters for organ perfusion  beta blockade not administered due to hemodynamic instability and bradycardia  monitor neurologic status  Head of the bed should remain elevated to 45 deg .   chest PT and IS will be encouraged  monitor adequacy of oxygenation and ventilation and attempt to wean oxygen  antibiotic regimen will be tailored to the clinical, laboratory and microbiologic data  Nutritional goals will be met using po eventually , ensure adequate caloric intake and montior the same  Stress ulcer and VTE prophylaxis will be achieved    Glycemic control is satisfactory  Electrolytes have been repleted as necessary and wound care has been carried out. Pain control has been achieved.   agressive physical therapy and early mobility and ambulation goals will be met   The family was updated about the course and plan  CRITICAL CARE TIME Upon my evaluation, this patient had a high probability of imminent or life-threatening deterioration due to the above problems which required my direct attention, intervention, and personal management.  I have personally provided 150 minutes of critical care time exclusive of time spent on separately billable procedures. Time included review of laboratory data, radiology results, discussion with consultants, and monitoring for potential decompensation. Interventions were performed as documented abovepersonally provided by me  in evaluation and management, reassessments, review and interpretation of labs and x-rays, ventilator and hemodynamic management, formulating a plan and coordinating care: ___150___ MIN.  Time does not include procedural time.    CTICU ATTENDING     					    Philippe Tillman MD

## 2024-10-17 NOTE — DIETITIAN INITIAL EVALUATION ADULT - PERSON TAUGHT/METHOD
Encouraged adequate PO intake with emphasis on protein for post-op healing, reviewed sources of protein. Discussed heart healthy diet./verbal instruction/patient instructed

## 2024-10-17 NOTE — PHYSICAL THERAPY INITIAL EVALUATION ADULT - ADDITIONAL COMMENTS
Community ambulator who lives with his wife in private house +[address], +FOS inside. Independent with all ADLs prior and denies use of AD when ambulating.

## 2024-10-17 NOTE — PROGRESS NOTE ADULT - SUBJECTIVE AND OBJECTIVE BOX
CTICU  CRITICAL  CARE  attending     Hand off received 					   Pertinent clinical, laboratory, radiographic, hemodynamic, echocardiographic, respiratory data, microbiologic data and chart were reviewed and analyzed frequently throughout the course of the day and night        59 year old male with HTN, HLD, pre DM, fam hx of CAD.  He was evaluated for STONER.   Stress test was positive for ischemia.  CCTA was suspicious for CAD   On 10/10/24 he underwent a LHC at [facility] with Dr. Jackson revealed 3v CAD (LM diffuse disease, moderate distal, LAD moderate diffuse prox, severe mid stenosis, severe diag stenosis, LCx: 70% OM stenosis, diffuse disease, RCA 80% mid distal stenosis.  The patient was referred to Dr. Jacob for CAG      S/P CABG X 4       FAMILY HISTORY:  PAST MEDICAL & SURGICAL HISTORY:  CAD (coronary artery disease)  H/O right inguinal hernia repair  H/O left inguinal hernia repair  H/O right knee surgery        14 system review was unremarkable    Vital signs, hemodynamic and respiratory parameters were reviewed from the bedside nursing flow sheet.  ICU Vital Signs Last 24 Hrs  T(C): 37.2 (17 Oct 2024 21:22), Max: 37.2 (17 Oct 2024 21:22)  T(F): 98.9 (17 Oct 2024 21:22), Max: 98.9 (17 Oct 2024 21:22)  HR: 97 (17 Oct 2024 21:00) (64 - 97)  BP: 110/55 (17 Oct 2024 21:00) (92/56 - 126/68)  BP(mean): 76 (17 Oct 2024 21:00) (68 - 90)  ABP: 100/54 (17 Oct 2024 15:00) (100/54 - 125/59)  ABP(mean): 70 (17 Oct 2024 15:00) (65 - 80)  RR: 17 (17 Oct 2024 21:00) (12 - 20)  SpO2: 91% (17 Oct 2024 21:00) (91% - 98%)    O2 Parameters below as of 17 Oct 2024 21:00  Patient On (Oxygen Delivery Method): nasal cannula w/ humidification  O2 Flow (L/min): 2        Adult Advanced Hemodynamics Last 24 Hrs  CVP(mm Hg): 20 (17 Oct 2024 21:00) (2 - 305)  CVP(cm H2O): --  CO: --  CI: --  PA: --  PA(mean): --  PCWP: --  SVR: --  SVRI: --  PVR: --  PVRI: --, ABG - ( 17 Oct 2024 12:17 )  pH, Arterial: 7.42  pH, Blood: x     /  pCO2: 33    /  pO2: 79    / HCO3: 21    / Base Excess: -2.3  /  SaO2: 99.4                Intake and output was reviewed and the fluid balance was calculated  Daily     Daily   I&O's Summary    16 Oct 2024 07:01  -  17 Oct 2024 07:00  --------------------------------------------------------  IN: 2023 mL / OUT: 2260 mL / NET: -237 mL    17 Oct 2024 07:01  -  17 Oct 2024 21:56  --------------------------------------------------------  IN: 704.5 mL / OUT: 1455 mL / NET: -750.5 mL        All lines and drain sites were assessed        Neuro: No change in the mental status from the baseline.   Neck: No JVD.  CVS: S1, S2, No S3.  Lungs: Good air entry bilaterally.  Abd: Soft. No tenderness. + Bowel sounds.  Vascular: + DP/PT.  Extremities: No edema.  Lymphatic: Normal.  Skin: No abnormalities.      labs  CBC Full  -  ( 17 Oct 2024 12:08 )  WBC Count : 11.21 K/uL  RBC Count : 3.33 M/uL  Hemoglobin : 9.4 g/dL  Hematocrit : 28.2 %  Platelet Count - Automated : 147 K/uL  Mean Cell Volume : 84.7 fl  Mean Cell Hemoglobin : 28.2 pg  Mean Cell Hemoglobin Concentration : 33.3 gm/dL  Auto Neutrophil # : 8.62 K/uL  Auto Lymphocyte # : 1.18 K/uL  Auto Monocyte # : 1.36 K/uL  Auto Eosinophil # : 0.00 K/uL  Auto Basophil # : 0.01 K/uL  Auto Neutrophil % : 76.9 %  Auto Lymphocyte % : 10.5 %  Auto Monocyte % : 12.1 %  Auto Eosinophil % : 0.0 %  Auto Basophil % : 0.1 %    10-17    133[L]  |  99  |  11  ----------------------------<  141[H]  4.3   |  24  |  0.97    Ca    7.9[L]      17 Oct 2024 12:08  Phos  3.0     10-17  Mg     2.4     10-17    TPro  5.1[L]  /  Alb  3.3  /  TBili  0.5  /  DBili  x   /  AST  49[H]  /  ALT  13  /  AlkPhos  39[L]  10-17    PT/INR - ( 17 Oct 2024 12:08 )   PT: 15.2 sec;   INR: 1.33          PTT - ( 17 Oct 2024 12:08 )  PTT:27.8 sec  The current medications were reviewed   MEDICATIONS  (STANDING):  acetaminophen     Tablet .. 1000 milliGRAM(s) Oral every 6 hours  ascorbic acid 500 milliGRAM(s) Oral daily  aspirin enteric coated 81 milliGRAM(s) Oral daily  atorvastatin 80 milliGRAM(s) Oral at bedtime  ceFAZolin   IVPB 2000 milliGRAM(s) IV Intermittent every 8 hours  chlorhexidine 0.12% Liquid 15 milliLiter(s) Oral Mucosa every 12 hours  chlorhexidine 2% Cloths 1 Application(s) Topical daily  dextrose 5%. 1000 milliLiter(s) (50 mL/Hr) IV Continuous <Continuous>  dextrose 5%. 1000 milliLiter(s) (100 mL/Hr) IV Continuous <Continuous>  dextrose 50% Injectable 25 milliLiter(s) IV Push every 15 minutes  dextrose 50% Injectable 50 milliLiter(s) IV Push every 15 minutes  gabapentin 100 milliGRAM(s) Oral three times a day  glucagon  Injectable 1 milliGRAM(s) IntraMuscular once  heparin   Injectable 5000 Unit(s) SubCutaneous every 8 hours  influenza   Vaccine 0.5 milliLiter(s) IntraMuscular once  insulin lispro (ADMELOG) corrective regimen sliding scale   SubCutaneous three times a day before meals  insulin lispro (ADMELOG) corrective regimen sliding scale   SubCutaneous at bedtime  mupirocin 2% Nasal 1 Application(s) Both Nostrils two times a day  pantoprazole    Tablet 40 milliGRAM(s) Oral before breakfast  polyethylene glycol 3350 17 Gram(s) Oral daily  senna 2 Tablet(s) Oral at bedtime  sodium chloride 0.9%. 1000 milliLiter(s) (10 mL/Hr) IV Continuous <Continuous>    MEDICATIONS  (PRN):  dextrose Oral Gel 15 Gram(s) Oral once PRN Blood Glucose LESS THAN 70 milliGRAM(s)/deciliter  oxyCODONE    IR 5 milliGRAM(s) Oral every 6 hours PRN Moderate Pain (4 - 6)  oxyCODONE    IR 10 milliGRAM(s) Oral every 6 hours PRN Severe Pain (7 - 10)              59 year old  Male admitted with triple vessel CAD   S/P CABG x 4.  Hemodynamically stable.  Good oxygenation.  Fair urine out put.        My plan includes :  Statin and Betablocker.  Close hemodynamic monitoring   Monitor for arrhythmias and monitor parameters for organ perfusion  Monitor neurologic status  Monitor renal function.  Head of the bed should remain elevated to 45 deg .   Chest PT and IS will be encouraged  Monitor adequacy of oxygenation   Nutritional goals will be met using po eventually , ensure adequate caloric intake and monitor the same  Stress ulcer and VTE prophylaxis will be achieved    Glycemic control is satisfactory  Electrolytes have been repleted as necessary and wound care has been carried out. Pain control has been achieved.   Aggressive physical therapy and early mobility and ambulation goals will be met   The family was updated about the course and plan  CRITICAL CARE TIME SPENT in evaluation and management, reassessments, review and interpretation of labs and x-rays, hemodynamic management, formulating a plan and coordinating care: ___30____ MIN.  Time does not include procedural time.  CTICU ATTENDING     					    Aries Hung MD

## 2024-10-17 NOTE — PHYSICAL THERAPY INITIAL EVALUATION ADULT - PERTINENT HX OF CURRENT PROBLEM, REHAB EVAL
59 year old  Male admitted with triple vessel CAD   S/P CABG  Hemodynamically stable.  Good oxygenation.  Fair urine out put.

## 2024-10-18 ENCOUNTER — TRANSCRIPTION ENCOUNTER (OUTPATIENT)
Age: 59
End: 2024-10-18

## 2024-10-18 PROBLEM — I25.10 ATHEROSCLEROTIC HEART DISEASE OF NATIVE CORONARY ARTERY WITHOUT ANGINA PECTORIS: Chronic | Status: ACTIVE | Noted: 2024-10-14

## 2024-10-18 LAB
ALBUMIN SERPL ELPH-MCNC: 3 G/DL — LOW (ref 3.3–5)
ALBUMIN SERPL ELPH-MCNC: 3.1 G/DL — LOW (ref 3.3–5)
ALP SERPL-CCNC: 37 U/L — LOW (ref 40–120)
ALP SERPL-CCNC: 42 U/L — SIGNIFICANT CHANGE UP (ref 40–120)
ALT FLD-CCNC: 10 U/L — SIGNIFICANT CHANGE UP (ref 10–45)
ALT FLD-CCNC: 10 U/L — SIGNIFICANT CHANGE UP (ref 10–45)
ANION GAP SERPL CALC-SCNC: 7 MMOL/L — SIGNIFICANT CHANGE UP (ref 5–17)
ANION GAP SERPL CALC-SCNC: 7 MMOL/L — SIGNIFICANT CHANGE UP (ref 5–17)
APTT BLD: 30.3 SEC — SIGNIFICANT CHANGE UP (ref 24.5–35.6)
APTT BLD: 32.8 SEC — SIGNIFICANT CHANGE UP (ref 24.5–35.6)
AST SERPL-CCNC: 30 U/L — SIGNIFICANT CHANGE UP (ref 10–40)
AST SERPL-CCNC: 34 U/L — SIGNIFICANT CHANGE UP (ref 10–40)
BASOPHILS # BLD AUTO: 0.03 K/UL — SIGNIFICANT CHANGE UP (ref 0–0.2)
BASOPHILS # BLD AUTO: 0.03 K/UL — SIGNIFICANT CHANGE UP (ref 0–0.2)
BASOPHILS NFR BLD AUTO: 0.3 % — SIGNIFICANT CHANGE UP (ref 0–2)
BASOPHILS NFR BLD AUTO: 0.3 % — SIGNIFICANT CHANGE UP (ref 0–2)
BILIRUB SERPL-MCNC: 0.4 MG/DL — SIGNIFICANT CHANGE UP (ref 0.2–1.2)
BILIRUB SERPL-MCNC: 0.5 MG/DL — SIGNIFICANT CHANGE UP (ref 0.2–1.2)
BUN SERPL-MCNC: 10 MG/DL — SIGNIFICANT CHANGE UP (ref 7–23)
BUN SERPL-MCNC: 13 MG/DL — SIGNIFICANT CHANGE UP (ref 7–23)
CALCIUM SERPL-MCNC: 7.5 MG/DL — LOW (ref 8.4–10.5)
CALCIUM SERPL-MCNC: 7.8 MG/DL — LOW (ref 8.4–10.5)
CHLORIDE SERPL-SCNC: 101 MMOL/L — SIGNIFICANT CHANGE UP (ref 96–108)
CHLORIDE SERPL-SCNC: 103 MMOL/L — SIGNIFICANT CHANGE UP (ref 96–108)
CO2 SERPL-SCNC: 26 MMOL/L — SIGNIFICANT CHANGE UP (ref 22–31)
CO2 SERPL-SCNC: 27 MMOL/L — SIGNIFICANT CHANGE UP (ref 22–31)
CREAT SERPL-MCNC: 0.89 MG/DL — SIGNIFICANT CHANGE UP (ref 0.5–1.3)
CREAT SERPL-MCNC: 0.98 MG/DL — SIGNIFICANT CHANGE UP (ref 0.5–1.3)
EGFR: 88 ML/MIN/1.73M2 — SIGNIFICANT CHANGE UP
EGFR: 98 ML/MIN/1.73M2 — SIGNIFICANT CHANGE UP
EOSINOPHIL # BLD AUTO: 0 K/UL — SIGNIFICANT CHANGE UP (ref 0–0.5)
EOSINOPHIL # BLD AUTO: 0.01 K/UL — SIGNIFICANT CHANGE UP (ref 0–0.5)
EOSINOPHIL NFR BLD AUTO: 0 % — SIGNIFICANT CHANGE UP (ref 0–6)
EOSINOPHIL NFR BLD AUTO: 0.1 % — SIGNIFICANT CHANGE UP (ref 0–6)
GLUCOSE BLDC GLUCOMTR-MCNC: 121 MG/DL — HIGH (ref 70–99)
GLUCOSE BLDC GLUCOMTR-MCNC: 126 MG/DL — HIGH (ref 70–99)
GLUCOSE BLDC GLUCOMTR-MCNC: 137 MG/DL — HIGH (ref 70–99)
GLUCOSE BLDC GLUCOMTR-MCNC: 166 MG/DL — HIGH (ref 70–99)
GLUCOSE BLDC GLUCOMTR-MCNC: 167 MG/DL — HIGH (ref 70–99)
GLUCOSE SERPL-MCNC: 135 MG/DL — HIGH (ref 70–99)
GLUCOSE SERPL-MCNC: 147 MG/DL — HIGH (ref 70–99)
HCT VFR BLD CALC: 26.3 % — LOW (ref 39–50)
HCT VFR BLD CALC: 27.2 % — LOW (ref 39–50)
HGB BLD-MCNC: 8.8 G/DL — LOW (ref 13–17)
HGB BLD-MCNC: 8.9 G/DL — LOW (ref 13–17)
IMM GRANULOCYTES NFR BLD AUTO: 0.4 % — SIGNIFICANT CHANGE UP (ref 0–0.9)
IMM GRANULOCYTES NFR BLD AUTO: 0.6 % — SIGNIFICANT CHANGE UP (ref 0–0.9)
INR BLD: 1.15 — SIGNIFICANT CHANGE UP (ref 0.85–1.16)
INR BLD: 1.28 — HIGH (ref 0.85–1.16)
LYMPHOCYTES # BLD AUTO: 1.38 K/UL — SIGNIFICANT CHANGE UP (ref 1–3.3)
LYMPHOCYTES # BLD AUTO: 1.83 K/UL — SIGNIFICANT CHANGE UP (ref 1–3.3)
LYMPHOCYTES # BLD AUTO: 12.2 % — LOW (ref 13–44)
LYMPHOCYTES # BLD AUTO: 16.9 % — SIGNIFICANT CHANGE UP (ref 13–44)
MAGNESIUM SERPL-MCNC: 2 MG/DL — SIGNIFICANT CHANGE UP (ref 1.6–2.6)
MAGNESIUM SERPL-MCNC: 2.4 MG/DL — SIGNIFICANT CHANGE UP (ref 1.6–2.6)
MCHC RBC-ENTMCNC: 28.3 PG — SIGNIFICANT CHANGE UP (ref 27–34)
MCHC RBC-ENTMCNC: 29.3 PG — SIGNIFICANT CHANGE UP (ref 27–34)
MCHC RBC-ENTMCNC: 32.7 GM/DL — SIGNIFICANT CHANGE UP (ref 32–36)
MCHC RBC-ENTMCNC: 33.5 GM/DL — SIGNIFICANT CHANGE UP (ref 32–36)
MCV RBC AUTO: 86.3 FL — SIGNIFICANT CHANGE UP (ref 80–100)
MCV RBC AUTO: 87.7 FL — SIGNIFICANT CHANGE UP (ref 80–100)
MONOCYTES # BLD AUTO: 0.98 K/UL — HIGH (ref 0–0.9)
MONOCYTES # BLD AUTO: 1.26 K/UL — HIGH (ref 0–0.9)
MONOCYTES NFR BLD AUTO: 11.6 % — SIGNIFICANT CHANGE UP (ref 2–14)
MONOCYTES NFR BLD AUTO: 8.6 % — SIGNIFICANT CHANGE UP (ref 2–14)
NEUTROPHILS # BLD AUTO: 7.64 K/UL — HIGH (ref 1.8–7.4)
NEUTROPHILS # BLD AUTO: 8.91 K/UL — HIGH (ref 1.8–7.4)
NEUTROPHILS NFR BLD AUTO: 70.5 % — SIGNIFICANT CHANGE UP (ref 43–77)
NEUTROPHILS NFR BLD AUTO: 78.5 % — HIGH (ref 43–77)
NRBC # BLD: 0 /100 WBCS — SIGNIFICANT CHANGE UP (ref 0–0)
NRBC # BLD: 0 /100 WBCS — SIGNIFICANT CHANGE UP (ref 0–0)
PHOSPHATE SERPL-MCNC: 1.7 MG/DL — LOW (ref 2.5–4.5)
PHOSPHATE SERPL-MCNC: 2.2 MG/DL — LOW (ref 2.5–4.5)
PLATELET # BLD AUTO: 115 K/UL — LOW (ref 150–400)
PLATELET # BLD AUTO: 129 K/UL — LOW (ref 150–400)
POTASSIUM SERPL-MCNC: 3.9 MMOL/L — SIGNIFICANT CHANGE UP (ref 3.5–5.3)
POTASSIUM SERPL-MCNC: 4.1 MMOL/L — SIGNIFICANT CHANGE UP (ref 3.5–5.3)
POTASSIUM SERPL-SCNC: 3.9 MMOL/L — SIGNIFICANT CHANGE UP (ref 3.5–5.3)
POTASSIUM SERPL-SCNC: 4.1 MMOL/L — SIGNIFICANT CHANGE UP (ref 3.5–5.3)
PROT SERPL-MCNC: 4.9 G/DL — LOW (ref 6–8.3)
PROT SERPL-MCNC: 5.4 G/DL — LOW (ref 6–8.3)
PROTHROM AB SERPL-ACNC: 13.4 SEC — SIGNIFICANT CHANGE UP (ref 9.9–13.4)
PROTHROM AB SERPL-ACNC: 14.7 SEC — HIGH (ref 9.9–13.4)
RBC # BLD: 3 M/UL — LOW (ref 4.2–5.8)
RBC # BLD: 3.15 M/UL — LOW (ref 4.2–5.8)
RBC # FLD: 13.2 % — SIGNIFICANT CHANGE UP (ref 10.3–14.5)
RBC # FLD: 13.2 % — SIGNIFICANT CHANGE UP (ref 10.3–14.5)
SODIUM SERPL-SCNC: 134 MMOL/L — LOW (ref 135–145)
SODIUM SERPL-SCNC: 137 MMOL/L — SIGNIFICANT CHANGE UP (ref 135–145)
WBC # BLD: 10.83 K/UL — HIGH (ref 3.8–10.5)
WBC # BLD: 11.35 K/UL — HIGH (ref 3.8–10.5)
WBC # FLD AUTO: 10.83 K/UL — HIGH (ref 3.8–10.5)
WBC # FLD AUTO: 11.35 K/UL — HIGH (ref 3.8–10.5)

## 2024-10-18 PROCEDURE — 71045 X-RAY EXAM CHEST 1 VIEW: CPT | Mod: 26

## 2024-10-18 RX ORDER — ONDANSETRON HYDROCHLORIDE 2 MG/ML
4 INJECTION, SOLUTION INTRAMUSCULAR; INTRAVENOUS ONCE
Refills: 0 | Status: COMPLETED | OUTPATIENT
Start: 2024-10-18 | End: 2024-10-18

## 2024-10-18 RX ORDER — SODIUM CHLORIDE 9 MG/ML
3 INJECTION, SOLUTION INTRAMUSCULAR; INTRAVENOUS; SUBCUTANEOUS EVERY 8 HOURS
Refills: 0 | Status: DISCONTINUED | OUTPATIENT
Start: 2024-10-18 | End: 2024-10-22

## 2024-10-18 RX ORDER — SODIUM PHOSPHATE, MONOBASIC, MONOHYDRATE AND SODIUM PHOSPHATE, DIBASIC ANHYDROUS 276; 142 MG/ML; MG/ML
15 INJECTION, SOLUTION INTRAVENOUS ONCE
Refills: 0 | Status: COMPLETED | OUTPATIENT
Start: 2024-10-18 | End: 2024-10-18

## 2024-10-18 RX ORDER — POTASSIUM CHLORIDE 10 MEQ
20 TABLET, EXTENDED RELEASE ORAL ONCE
Refills: 0 | Status: COMPLETED | OUTPATIENT
Start: 2024-10-18 | End: 2024-10-18

## 2024-10-18 RX ORDER — DIBASIC SODIUM PHOSPHATE, MONOBASIC POTASSIUM PHOSPHATE AND MONOBASIC SODIUM PHOSPHATE 852; 155; 130 MG/1; MG/1; MG/1
1 TABLET ORAL ONCE
Refills: 0 | Status: COMPLETED | OUTPATIENT
Start: 2024-10-18 | End: 2024-10-18

## 2024-10-18 RX ORDER — MIDODRINE HYDROCHLORIDE 2.5 MG/1
5 TABLET ORAL ONCE
Refills: 0 | Status: COMPLETED | OUTPATIENT
Start: 2024-10-18 | End: 2024-10-18

## 2024-10-18 RX ADMIN — DIBASIC SODIUM PHOSPHATE, MONOBASIC POTASSIUM PHOSPHATE AND MONOBASIC SODIUM PHOSPHATE 1 PACKET(S): 852; 155; 130 TABLET ORAL at 16:36

## 2024-10-18 RX ADMIN — Medication 1000 MILLIGRAM(S): at 06:26

## 2024-10-18 RX ADMIN — SODIUM PHOSPHATE, MONOBASIC, MONOHYDRATE AND SODIUM PHOSPHATE, DIBASIC ANHYDROUS 62.5 MILLIMOLE(S): 276; 142 INJECTION, SOLUTION INTRAVENOUS at 06:30

## 2024-10-18 RX ADMIN — Medication 81 MILLIGRAM(S): at 12:25

## 2024-10-18 RX ADMIN — GABAPENTIN 100 MILLIGRAM(S): 300 CAPSULE ORAL at 06:28

## 2024-10-18 RX ADMIN — Medication 1000 MILLIGRAM(S): at 12:59

## 2024-10-18 RX ADMIN — POLYETHYLENE GLYCOL 3350 17 GRAM(S): 17 POWDER, FOR SOLUTION ORAL at 12:26

## 2024-10-18 RX ADMIN — HEPARIN SODIUM 5000 UNIT(S): 10000 INJECTION INTRAVENOUS; SUBCUTANEOUS at 06:27

## 2024-10-18 RX ADMIN — SODIUM CHLORIDE 3 MILLILITER(S): 9 INJECTION, SOLUTION INTRAMUSCULAR; INTRAVENOUS; SUBCUTANEOUS at 13:34

## 2024-10-18 RX ADMIN — Medication 2: at 12:26

## 2024-10-18 RX ADMIN — HEPARIN SODIUM 5000 UNIT(S): 10000 INJECTION INTRAVENOUS; SUBCUTANEOUS at 13:31

## 2024-10-18 RX ADMIN — HEPARIN SODIUM 5000 UNIT(S): 10000 INJECTION INTRAVENOUS; SUBCUTANEOUS at 21:41

## 2024-10-18 RX ADMIN — MUPIROCIN 1 APPLICATION(S): 20 OINTMENT TOPICAL at 18:25

## 2024-10-18 RX ADMIN — ONDANSETRON HYDROCHLORIDE 4 MILLIGRAM(S): 2 INJECTION, SOLUTION INTRAMUSCULAR; INTRAVENOUS at 06:30

## 2024-10-18 RX ADMIN — MIDODRINE HYDROCHLORIDE 5 MILLIGRAM(S): 2.5 TABLET ORAL at 06:28

## 2024-10-18 RX ADMIN — Medication 1000 MILLIGRAM(S): at 07:10

## 2024-10-18 RX ADMIN — MIDODRINE HYDROCHLORIDE 5 MILLIGRAM(S): 2.5 TABLET ORAL at 13:31

## 2024-10-18 RX ADMIN — Medication 1000 MILLIGRAM(S): at 22:40

## 2024-10-18 RX ADMIN — GABAPENTIN 100 MILLIGRAM(S): 300 CAPSULE ORAL at 21:40

## 2024-10-18 RX ADMIN — Medication 1000 MILLIGRAM(S): at 12:25

## 2024-10-18 RX ADMIN — Medication 2 TABLET(S): at 21:41

## 2024-10-18 RX ADMIN — Medication 80 MILLIGRAM(S): at 21:40

## 2024-10-18 RX ADMIN — SODIUM CHLORIDE 3 MILLILITER(S): 9 INJECTION, SOLUTION INTRAMUSCULAR; INTRAVENOUS; SUBCUTANEOUS at 22:05

## 2024-10-18 RX ADMIN — Medication 500 MILLIGRAM(S): at 12:26

## 2024-10-18 RX ADMIN — Medication 20 MILLIEQUIVALENT(S): at 06:27

## 2024-10-18 RX ADMIN — Medication 1000 MILLIGRAM(S): at 21:40

## 2024-10-18 RX ADMIN — GABAPENTIN 100 MILLIGRAM(S): 300 CAPSULE ORAL at 13:31

## 2024-10-18 RX ADMIN — Medication 100 MILLIGRAM(S): at 04:55

## 2024-10-18 RX ADMIN — PANTOPRAZOLE SODIUM 40 MILLIGRAM(S): 40 TABLET, DELAYED RELEASE ORAL at 06:28

## 2024-10-18 RX ADMIN — MUPIROCIN 1 APPLICATION(S): 20 OINTMENT TOPICAL at 06:28

## 2024-10-18 NOTE — DISCHARGE NOTE NURSING/CASE MANAGEMENT/SOCIAL WORK - PATIENT PORTAL LINK FT
You can access the FollowMyHealth Patient Portal offered by [facility] by registering at the following website: [email or web address]. By joining [employer]’s FollowMyHealth portal, you will also be able to view your health information using other applications (apps) compatible with our system.

## 2024-10-18 NOTE — DISCHARGE NOTE NURSING/CASE MANAGEMENT/SOCIAL WORK - FINANCIAL ASSISTANCE
[facility] provides services at a reduced cost to those who are determined to be eligible through [facility]’s financial assistance program. Information regarding [facility]’s financial assistance program can be found by going to [email or web address] or by calling [phone].

## 2024-10-19 LAB
ANION GAP SERPL CALC-SCNC: 6 MMOL/L — SIGNIFICANT CHANGE UP (ref 5–17)
BUN SERPL-MCNC: 10 MG/DL — SIGNIFICANT CHANGE UP (ref 7–23)
CALCIUM SERPL-MCNC: 8.1 MG/DL — LOW (ref 8.4–10.5)
CHLORIDE SERPL-SCNC: 105 MMOL/L — SIGNIFICANT CHANGE UP (ref 96–108)
CO2 SERPL-SCNC: 27 MMOL/L — SIGNIFICANT CHANGE UP (ref 22–31)
CREAT SERPL-MCNC: 0.91 MG/DL — SIGNIFICANT CHANGE UP (ref 0.5–1.3)
EGFR: 96 ML/MIN/1.73M2 — SIGNIFICANT CHANGE UP
GLUCOSE BLDC GLUCOMTR-MCNC: 108 MG/DL — HIGH (ref 70–99)
GLUCOSE BLDC GLUCOMTR-MCNC: 110 MG/DL — HIGH (ref 70–99)
GLUCOSE BLDC GLUCOMTR-MCNC: 112 MG/DL — HIGH (ref 70–99)
GLUCOSE BLDC GLUCOMTR-MCNC: 113 MG/DL — HIGH (ref 70–99)
GLUCOSE SERPL-MCNC: 99 MG/DL — SIGNIFICANT CHANGE UP (ref 70–99)
HCT VFR BLD CALC: 27.6 % — LOW (ref 39–50)
HGB BLD-MCNC: 8.9 G/DL — LOW (ref 13–17)
MAGNESIUM SERPL-MCNC: 2.3 MG/DL — SIGNIFICANT CHANGE UP (ref 1.6–2.6)
MCHC RBC-ENTMCNC: 29 PG — SIGNIFICANT CHANGE UP (ref 27–34)
MCHC RBC-ENTMCNC: 32.2 GM/DL — SIGNIFICANT CHANGE UP (ref 32–36)
MCV RBC AUTO: 89.9 FL — SIGNIFICANT CHANGE UP (ref 80–100)
NRBC # BLD: 0 /100 WBCS — SIGNIFICANT CHANGE UP (ref 0–0)
PLATELET # BLD AUTO: 137 K/UL — LOW (ref 150–400)
POTASSIUM SERPL-MCNC: 4.5 MMOL/L — SIGNIFICANT CHANGE UP (ref 3.5–5.3)
POTASSIUM SERPL-SCNC: 4.5 MMOL/L — SIGNIFICANT CHANGE UP (ref 3.5–5.3)
RBC # BLD: 3.07 M/UL — LOW (ref 4.2–5.8)
RBC # FLD: 13.4 % — SIGNIFICANT CHANGE UP (ref 10.3–14.5)
SODIUM SERPL-SCNC: 138 MMOL/L — SIGNIFICANT CHANGE UP (ref 135–145)
WBC # BLD: 10.38 K/UL — SIGNIFICANT CHANGE UP (ref 3.8–10.5)
WBC # FLD AUTO: 10.38 K/UL — SIGNIFICANT CHANGE UP (ref 3.8–10.5)

## 2024-10-19 PROCEDURE — 71046 X-RAY EXAM CHEST 2 VIEWS: CPT | Mod: 26

## 2024-10-19 PROCEDURE — 71045 X-RAY EXAM CHEST 1 VIEW: CPT | Mod: 26,59

## 2024-10-19 RX ORDER — FUROSEMIDE 40 MG
20 TABLET ORAL ONCE
Refills: 0 | Status: COMPLETED | OUTPATIENT
Start: 2024-10-19 | End: 2024-10-19

## 2024-10-19 RX ORDER — POTASSIUM CHLORIDE 10 MEQ
20 TABLET, EXTENDED RELEASE ORAL ONCE
Refills: 0 | Status: COMPLETED | OUTPATIENT
Start: 2024-10-19 | End: 2024-10-19

## 2024-10-19 RX ORDER — METOPROLOL TARTRATE 50 MG
12.5 TABLET ORAL EVERY 12 HOURS
Refills: 0 | Status: DISCONTINUED | OUTPATIENT
Start: 2024-10-19 | End: 2024-10-22

## 2024-10-19 RX ADMIN — HEPARIN SODIUM 5000 UNIT(S): 10000 INJECTION INTRAVENOUS; SUBCUTANEOUS at 15:02

## 2024-10-19 RX ADMIN — Medication 1000 MILLIGRAM(S): at 10:40

## 2024-10-19 RX ADMIN — Medication 1000 MILLIGRAM(S): at 05:32

## 2024-10-19 RX ADMIN — Medication 1000 MILLIGRAM(S): at 07:48

## 2024-10-19 RX ADMIN — MUPIROCIN 1 APPLICATION(S): 20 OINTMENT TOPICAL at 05:42

## 2024-10-19 RX ADMIN — Medication 1000 MILLIGRAM(S): at 17:44

## 2024-10-19 RX ADMIN — PANTOPRAZOLE SODIUM 40 MILLIGRAM(S): 40 TABLET, DELAYED RELEASE ORAL at 07:48

## 2024-10-19 RX ADMIN — Medication 12.5 MILLIGRAM(S): at 17:44

## 2024-10-19 RX ADMIN — SODIUM CHLORIDE 3 MILLILITER(S): 9 INJECTION, SOLUTION INTRAMUSCULAR; INTRAVENOUS; SUBCUTANEOUS at 05:42

## 2024-10-19 RX ADMIN — Medication 1000 MILLIGRAM(S): at 18:18

## 2024-10-19 RX ADMIN — Medication 80 MILLIGRAM(S): at 21:51

## 2024-10-19 RX ADMIN — Medication 20 MILLIGRAM(S): at 12:41

## 2024-10-19 RX ADMIN — HEPARIN SODIUM 5000 UNIT(S): 10000 INJECTION INTRAVENOUS; SUBCUTANEOUS at 05:33

## 2024-10-19 RX ADMIN — HEPARIN SODIUM 5000 UNIT(S): 10000 INJECTION INTRAVENOUS; SUBCUTANEOUS at 21:50

## 2024-10-19 RX ADMIN — GABAPENTIN 100 MILLIGRAM(S): 300 CAPSULE ORAL at 05:32

## 2024-10-19 RX ADMIN — SODIUM CHLORIDE 3 MILLILITER(S): 9 INJECTION, SOLUTION INTRAMUSCULAR; INTRAVENOUS; SUBCUTANEOUS at 16:52

## 2024-10-19 RX ADMIN — Medication 1000 MILLIGRAM(S): at 11:17

## 2024-10-19 RX ADMIN — SODIUM CHLORIDE 3 MILLILITER(S): 9 INJECTION, SOLUTION INTRAMUSCULAR; INTRAVENOUS; SUBCUTANEOUS at 22:24

## 2024-10-19 RX ADMIN — Medication 1000 MILLIGRAM(S): at 21:50

## 2024-10-19 RX ADMIN — GABAPENTIN 100 MILLIGRAM(S): 300 CAPSULE ORAL at 21:50

## 2024-10-19 RX ADMIN — CHLORHEXIDINE GLUCONATE 1 APPLICATION(S): 40 SOLUTION TOPICAL at 05:41

## 2024-10-19 RX ADMIN — GABAPENTIN 100 MILLIGRAM(S): 300 CAPSULE ORAL at 15:03

## 2024-10-19 RX ADMIN — MUPIROCIN 1 APPLICATION(S): 20 OINTMENT TOPICAL at 17:45

## 2024-10-19 RX ADMIN — Medication 1000 MILLIGRAM(S): at 22:20

## 2024-10-19 RX ADMIN — Medication 81 MILLIGRAM(S): at 12:41

## 2024-10-19 RX ADMIN — Medication 20 MILLIEQUIVALENT(S): at 12:42

## 2024-10-19 RX ADMIN — Medication 500 MILLIGRAM(S): at 12:42

## 2024-10-19 NOTE — PROGRESS NOTE ADULT - SUBJECTIVE AND OBJECTIVE BOX
Patient discussed on morning rounds with Dr. Smith    OPERATION & DATE: 10/16 CABG X4 (LIMA-LAD, RA-OM, SVG-PDA, SVG-Diag), EF 55%    SUBJECTIVE ASSESSMENT:  Assessed at bedside this morning. Patient's only complaint is of being moved rooms late last night. Denies chest pain, SOB.     VITAL SIGNS:  Vital Signs Last 24 Hrs  T(C): 36.3 (19 Oct 2024 13:49), Max: 38.1 (18 Oct 2024 21:30)  T(F): 97.3 (19 Oct 2024 13:49), Max: 100.6 (18 Oct 2024 21:30)  HR: 94 (19 Oct 2024 09:31) (88 - 100)  BP: 108/61 (19 Oct 2024 09:31) (99/56 - 111/54)  BP(mean): 77 (19 Oct 2024 09:31) (72 - 78)  RR: 18 (19 Oct 2024 09:31) (18 - 20)  SpO2: 90% (19 Oct 2024 09:31) (90% - 97%)    Parameters below as of 19 Oct 2024 09:31  Patient On (Oxygen Delivery Method): room air      I&O's Detail    18 Oct 2024 07:01  -  19 Oct 2024 07:00  --------------------------------------------------------  IN:    IV PiggyBack: 1062.5 mL    Oral Fluid: 960 mL  Total IN: 2022.5 mL    OUT:    Indwelling Catheter - Urethral (mL): 720 mL    Voided (mL): 1550 mL  Total OUT: 2270 mL    Total NET: -247.5 mL        CHEST TUBE:    LEE DRAIN:    EPICARDIAL WIRES:   STITCHES:  STAPLES:  BARILLAS:   CENTRAL LINE:  MIDLINE/PICC:  WOUND VAC:    PHYSICAL EXAM:  General:  HEENT:  Cardio:  Pulm:  GI:  Extremities:  Vascular:  Incisions:    LABS:                        8.9    10.38 )-----------( 137      ( 19 Oct 2024 05:30 )             27.6     PT/INR - ( 18 Oct 2024 11:18 )   PT: 13.4 sec;   INR: 1.15          PTT - ( 18 Oct 2024 11:18 )  PTT:30.3 sec  10-19    138  |  105  |  10  ----------------------------<  99  4.5   |  27  |  0.91    Ca    8.1[L]      19 Oct 2024 05:30  Phos  2.2     10-18  Mg     2.3     10-19    TPro  5.4[L]  /  Alb  3.1[L]  /  TBili  0.5  /  DBili  x   /  AST  30  /  ALT  10  /  AlkPhos  42  10-18    Urinalysis Basic - ( 19 Oct 2024 05:30 )    Color: x / Appearance: x / SG: x / pH: x  Gluc: 99 mg/dL / Ketone: x  / Bili: x / Urobili: x   Blood: x / Protein: x / Nitrite: x   Leuk Esterase: x / RBC: x / WBC x   Sq Epi: x / Non Sq Epi: x / Bacteria: x      MEDICATIONS  (STANDING):  acetaminophen     Tablet .. 1000 milliGRAM(s) Oral every 6 hours  ascorbic acid 500 milliGRAM(s) Oral daily  aspirin enteric coated 81 milliGRAM(s) Oral daily  atorvastatin 80 milliGRAM(s) Oral at bedtime  chlorhexidine 2% Cloths 1 Application(s) Topical daily  dextrose 5%. 1000 milliLiter(s) (50 mL/Hr) IV Continuous <Continuous>  dextrose 5%. 1000 milliLiter(s) (100 mL/Hr) IV Continuous <Continuous>  dextrose 50% Injectable 25 milliLiter(s) IV Push every 15 minutes  dextrose 50% Injectable 50 milliLiter(s) IV Push every 15 minutes  gabapentin 100 milliGRAM(s) Oral three times a day  glucagon  Injectable 1 milliGRAM(s) IntraMuscular once  heparin   Injectable 5000 Unit(s) SubCutaneous every 8 hours  influenza   Vaccine 0.5 milliLiter(s) IntraMuscular once  insulin lispro (ADMELOG) corrective regimen sliding scale   SubCutaneous three times a day before meals  insulin lispro (ADMELOG) corrective regimen sliding scale   SubCutaneous at bedtime  mupirocin 2% Nasal 1 Application(s) Both Nostrils two times a day  pantoprazole    Tablet 40 milliGRAM(s) Oral before breakfast  polyethylene glycol 3350 17 Gram(s) Oral daily  senna 2 Tablet(s) Oral at bedtime  sodium chloride 0.9% lock flush 3 milliLiter(s) IV Push every 8 hours  sodium chloride 0.9%. 1000 milliLiter(s) (10 mL/Hr) IV Continuous <Continuous>    MEDICATIONS  (PRN):  dextrose Oral Gel 15 Gram(s) Oral once PRN Blood Glucose LESS THAN 70 milliGRAM(s)/deciliter  oxyCODONE    IR 5 milliGRAM(s) Oral every 6 hours PRN Moderate Pain (4 - 6)    RADIOLOGY & ADDITIONAL TESTS:       Patient discussed on morning rounds with Dr. Smith    OPERATION & DATE: 10/16 CABG X4 (LIMA-LAD, RA-OM, SVG-PDA, SVG-Diag), EF 55%    SUBJECTIVE ASSESSMENT:  Assessed at bedside this morning. Patient's only complaint is of being moved rooms late last night. Denies chest pain, SOB. Using his IS and pulling 750 cc. Has had a BM post op, denies abdominal pain. Denies fever, chills, nausea, vomiting.     VITAL SIGNS:  Vital Signs Last 24 Hrs  T(C): 36.3 (19 Oct 2024 13:49), Max: 38.1 (18 Oct 2024 21:30)  T(F): 97.3 (19 Oct 2024 13:49), Max: 100.6 (18 Oct 2024 21:30)  HR: 94 (19 Oct 2024 09:31) (88 - 100)  BP: 108/61 (19 Oct 2024 09:31) (99/56 - 111/54)  BP(mean): 77 (19 Oct 2024 09:31) (72 - 78)  RR: 18 (19 Oct 2024 09:31) (18 - 20)  SpO2: 90% (19 Oct 2024 09:31) (90% - 97%)    Parameters below as of 19 Oct 2024 09:31  Patient On (Oxygen Delivery Method): room air      I&O's Detail    18 Oct 2024 07:01  -  19 Oct 2024 07:00  --------------------------------------------------------  IN:    IV PiggyBack: 1062.5 mL    Oral Fluid: 960 mL  Total IN: 2022.5 mL    OUT:    Indwelling Catheter - Urethral (mL): 720 mL    Voided (mL): 1550 mL  Total OUT: 2270 mL    Total NET: -247.5 mL    CHEST TUBE:  No  LEE DRAIN:  No  EPICARDIAL WIRES: Yes   STITCHES: No  STAPLES: No  BARILLAS:  No  CENTRAL LINE: No  MIDLINE/PICC: No  WOUND VAC: No    Physical Exam  CONSTITUTIONAL: Well appearing in NAD assessed laying comfortably in bed   NEURO: A&OX3. No focal deficits noted, moving bilateral upper and lower extremities                    CV: RRR, no murmurs, rubs, gallops  RESPIRATORY: Clear to auscultation bilateral posterior lung fields, no wheezes, rales, rhonchi   GI: +BS, NT/ND  MUSKULOSKELETAL: No peripheral edema or calf tenderness. Full strength and ROM bilateral upper and lower extremities   VASCULAR: Bilateral distal pulses 2+  INCISIONS: MSI clean, dry, intact, no sternal click. LLE Radial harvest site clean, dry, intact. LLE SVG harvest site clean, dry, intact     LABS:                        8.9    10.38 )-----------( 137      ( 19 Oct 2024 05:30 )             27.6     PT/INR - ( 18 Oct 2024 11:18 )   PT: 13.4 sec;   INR: 1.15          PTT - ( 18 Oct 2024 11:18 )  PTT:30.3 sec  10-19    138  |  105  |  10  ----------------------------<  99  4.5   |  27  |  0.91    Ca    8.1[L]      19 Oct 2024 05:30  Phos  2.2     10-18  Mg     2.3     10-19    TPro  5.4[L]  /  Alb  3.1[L]  /  TBili  0.5  /  DBili  x   /  AST  30  /  ALT  10  /  AlkPhos  42  10-18    Urinalysis Basic - ( 19 Oct 2024 05:30 )    Color: x / Appearance: x / SG: x / pH: x  Gluc: 99 mg/dL / Ketone: x  / Bili: x / Urobili: x   Blood: x / Protein: x / Nitrite: x   Leuk Esterase: x / RBC: x / WBC x   Sq Epi: x / Non Sq Epi: x / Bacteria: x      MEDICATIONS  (STANDING):  acetaminophen     Tablet .. 1000 milliGRAM(s) Oral every 6 hours  ascorbic acid 500 milliGRAM(s) Oral daily  aspirin enteric coated 81 milliGRAM(s) Oral daily  atorvastatin 80 milliGRAM(s) Oral at bedtime  chlorhexidine 2% Cloths 1 Application(s) Topical daily  dextrose 5%. 1000 milliLiter(s) (50 mL/Hr) IV Continuous <Continuous>  dextrose 5%. 1000 milliLiter(s) (100 mL/Hr) IV Continuous <Continuous>  dextrose 50% Injectable 25 milliLiter(s) IV Push every 15 minutes  dextrose 50% Injectable 50 milliLiter(s) IV Push every 15 minutes  gabapentin 100 milliGRAM(s) Oral three times a day  glucagon  Injectable 1 milliGRAM(s) IntraMuscular once  heparin   Injectable 5000 Unit(s) SubCutaneous every 8 hours  influenza   Vaccine 0.5 milliLiter(s) IntraMuscular once  insulin lispro (ADMELOG) corrective regimen sliding scale   SubCutaneous three times a day before meals  insulin lispro (ADMELOG) corrective regimen sliding scale   SubCutaneous at bedtime  mupirocin 2% Nasal 1 Application(s) Both Nostrils two times a day  pantoprazole    Tablet 40 milliGRAM(s) Oral before breakfast  polyethylene glycol 3350 17 Gram(s) Oral daily  senna 2 Tablet(s) Oral at bedtime  sodium chloride 0.9% lock flush 3 milliLiter(s) IV Push every 8 hours  sodium chloride 0.9%. 1000 milliLiter(s) (10 mL/Hr) IV Continuous <Continuous>    MEDICATIONS  (PRN):  dextrose Oral Gel 15 Gram(s) Oral once PRN Blood Glucose LESS THAN 70 milliGRAM(s)/deciliter  oxyCODONE    IR 5 milliGRAM(s) Oral every 6 hours PRN Moderate Pain (4 - 6)    RADIOLOGY & ADDITIONAL TESTS:    x< from: Xray Chest 1 View- PORTABLE-Routine (Xray Chest 1 View- PORTABLE-Routine in AM.) (10.19.24 @ 07:31) >  INTERPRETATION:  Clinical History: Postop    Frontal examination of the chest demonstrates limited inspiration. The   heart iswithin normal limits in transverse diameter. Bilateral   effusions. Discoid changes lung bases. Status post sternotomy.    IMPRESSION: Limited inspiration. Bilateral effusions. Discoid changes   lung bases    < end of copied text >

## 2024-10-19 NOTE — PROGRESS NOTE ADULT - ASSESSMENT
A/P:    Patient is a 59 y.o M with PMH of HTN, HLD, pre DM, fam hx of CAD who was following w/ Cardiologist and had an abnormal stress test in 7/2024 and underwent a CCTA on 10/7/24 that was suspicious for CAD. On 10/10/24 he underwent a LHC at [facility] with Dr. Jackson which revealed 3v CAD (LM diffuse disease, moderate distal, LAD moderate diffuse prox, severe mid stenosis, severe diag stenosis, LCx: 70% OM stenosis, diffuse disease, RCA 80% mid distal stenosis, right dominant coronary circulation) & patient was referred to Dr. Jacob for surgical evaluation. He was admitted to [facility] on 10/14/24 for medical optimization prior to OR. On 10/16 he underwent an uncomplicated CABG X4 (LIMA-LAD, RA-OM, SVG-PDA, SVG-Diag), EF 55%. Post operatively he was brought to the CTICU intubated in stable condition. He was extubated in short course. POD1 substernal and pleural chest tubes were removed. POD2 given 1 dose midodrine for low BP. Transferred to [facility]. POD3 remains stable pending rise in  BP to start BB.     Neurovascular:   - No delirium. Pain well controlled with current regimen.  -Continue tylenol standing post op per ERP protocol  - Continue gabapentin 100 mg q8 hours   - Continue oxycodone PRN     Cardiovascular:   - POD3 s/p CABG X4 (LIMA-LAD, RA-OM, SVG-PDA, SVG-Diag), EF 55%  -Hemodynamically stable. HR controlled (), start BB when BP allows   - Hx of HTN, BP controlled (90//62), will need norvasc after BB starts for radial graft  - CAD s/p CABG: continue ASA, atorvastatin 80 mg daily  - Continue vitamin C for post op AF ppx      Respiratory:   - 02 Sat = 98% on 2L NC.  -If on oxygen wean to RA from for O2 Sat > 93%.  -Encourage C+DB and Use of IS 10x / hr while awake.  -CXR PA/Lat with small bilateral effusions, diuresed today     GI: .  -Continue GI PPX with protonix  - Bowel regimen: continue miralax, senna   -PO Diet.    Renal / :  - BUN/Cr stable at 10/0.91  -Continue to monitor renal function.  -Monitor I/O's.  - Replete electrolytes PRN    Endocrine:    -A1c 5.6, no known hx DM, continue MISS  -TSH 6.00, no known hx thyroid disease     Hematologic:  -H/H stable at 8.9/27.6 with no obvious signs of bleeding  -Coagulation Panel.    ID:  -Pt remains afebrile with no elevation in WBC or signs of infection  -Continue to monitor CBC  -Observe for SIRS/Sepsis Syndrome.    Prophylaxis:  -DVT prophylaxis with 5000 SubQ Heparin q8h.  -SCD's    Disposition:  -Home when medically appropriate.   A/P:    Patient is a 59 y.o M with PMH of HTN, HLD, pre DM, fam hx of CAD who was following w/ Cardiologist and had an abnormal stress test in 7/2024 and underwent a CCTA on 10/7/24 that was suspicious for CAD. On 10/10/24 he underwent a LHC at [facility] with Dr. Jackson which revealed 3v CAD (LM diffuse disease, moderate distal, LAD moderate diffuse prox, severe mid stenosis, severe diag stenosis, LCx: 70% OM stenosis, diffuse disease, RCA 80% mid distal stenosis, right dominant coronary circulation) & patient was referred to Dr. Jacob for surgical evaluation. He was admitted to [facility] on 10/14/24 for medical optimization prior to OR. On 10/16 he underwent an uncomplicated CABG X4 (LIMA-LAD, RA-OM, SVG-PDA, SVG-Diag), EF 55%. Post operatively he was brought to the CTICU intubated in stable condition. He was extubated in short course. POD1 substernal and pleural chest tubes were removed. POD2 given 1 dose midodrine for low BP. Transferred to [facility]. POD3 remains stable pending rise in  BP to start BB.     Neurovascular:   - No delirium. Pain well controlled with current regimen.  -Continue tylenol standing post op per ERP protocol  - Continue gabapentin 100 mg q8 hours   - Continue oxycodone PRN     Cardiovascular:   - POD3 s/p CABG X4 (LIMA-LAD, RA-OM, SVG-PDA, SVG-Diag), EF 55%  -Hemodynamically stable. HR controlled (), start BB when BP allows   - Hx of HTN, BP controlled (90//62), will need norvasc after BB starts for radial graft  - CAD s/p CABG: continue ASA, atorvastatin 80 mg daily  - Continue vitamin C for post op AF ppx      Respiratory:   - 02 Sat = 98% on 2L NC.  -If on oxygen wean to RA from for O2 Sat > 93%.  -Encourage C+DB and Use of IS 10x / hr while awake.  -CXR PA/Lat with small bilateral effusions, diuresed today     GI: .  -Continue GI PPX with protonix  - Bowel regimen: continue miralax, senna   -PO Diet.    Renal / :  - BUN/Cr stable at 10/0.91  - Given lasix 20 IV X1 today, evaluate tomorrow if need for daily lasix   -Continue to monitor renal function.  -Monitor I/O's.  - Replete electrolytes PRN    Endocrine:    -A1c 5.6, no known hx DM, continue MISS  -TSH 6.00, no known hx thyroid disease     Hematologic:  -H/H stable at 8.9/27.6 with no obvious signs of bleeding  -Coagulation Panel.    ID:  -Pt remains afebrile with no elevation in WBC or signs of infection  -Continue to monitor CBC  -Observe for SIRS/Sepsis Syndrome.    Prophylaxis:  -DVT prophylaxis with 5000 SubQ Heparin q8h.  -SCD's    Disposition:  -Home when medically appropriate.

## 2024-10-20 LAB
ANION GAP SERPL CALC-SCNC: 7 MMOL/L — SIGNIFICANT CHANGE UP (ref 5–17)
BUN SERPL-MCNC: 10 MG/DL — SIGNIFICANT CHANGE UP (ref 7–23)
CALCIUM SERPL-MCNC: 8 MG/DL — LOW (ref 8.4–10.5)
CHLORIDE SERPL-SCNC: 105 MMOL/L — SIGNIFICANT CHANGE UP (ref 96–108)
CO2 SERPL-SCNC: 28 MMOL/L — SIGNIFICANT CHANGE UP (ref 22–31)
CREAT SERPL-MCNC: 0.85 MG/DL — SIGNIFICANT CHANGE UP (ref 0.5–1.3)
EGFR: 99 ML/MIN/1.73M2 — SIGNIFICANT CHANGE UP
GLUCOSE BLDC GLUCOMTR-MCNC: 107 MG/DL — HIGH (ref 70–99)
GLUCOSE BLDC GLUCOMTR-MCNC: 202 MG/DL — HIGH (ref 70–99)
GLUCOSE BLDC GLUCOMTR-MCNC: 86 MG/DL — SIGNIFICANT CHANGE UP (ref 70–99)
GLUCOSE BLDC GLUCOMTR-MCNC: 99 MG/DL — SIGNIFICANT CHANGE UP (ref 70–99)
GLUCOSE SERPL-MCNC: 90 MG/DL — SIGNIFICANT CHANGE UP (ref 70–99)
HCT VFR BLD CALC: 25.5 % — LOW (ref 39–50)
HGB BLD-MCNC: 8.2 G/DL — LOW (ref 13–17)
MAGNESIUM SERPL-MCNC: 2.8 MG/DL — HIGH (ref 1.6–2.6)
MCHC RBC-ENTMCNC: 29 PG — SIGNIFICANT CHANGE UP (ref 27–34)
MCHC RBC-ENTMCNC: 32.2 GM/DL — SIGNIFICANT CHANGE UP (ref 32–36)
MCV RBC AUTO: 90.1 FL — SIGNIFICANT CHANGE UP (ref 80–100)
NRBC # BLD: 0 /100 WBCS — SIGNIFICANT CHANGE UP (ref 0–0)
PLATELET # BLD AUTO: 168 K/UL — SIGNIFICANT CHANGE UP (ref 150–400)
POTASSIUM SERPL-MCNC: 4.2 MMOL/L — SIGNIFICANT CHANGE UP (ref 3.5–5.3)
POTASSIUM SERPL-SCNC: 4.2 MMOL/L — SIGNIFICANT CHANGE UP (ref 3.5–5.3)
RBC # BLD: 2.83 M/UL — LOW (ref 4.2–5.8)
RBC # FLD: 13.3 % — SIGNIFICANT CHANGE UP (ref 10.3–14.5)
SODIUM SERPL-SCNC: 140 MMOL/L — SIGNIFICANT CHANGE UP (ref 135–145)
WBC # BLD: 8.47 K/UL — SIGNIFICANT CHANGE UP (ref 3.8–10.5)
WBC # FLD AUTO: 8.47 K/UL — SIGNIFICANT CHANGE UP (ref 3.8–10.5)

## 2024-10-20 PROCEDURE — 93308 TTE F-UP OR LMTD: CPT | Mod: 26

## 2024-10-20 PROCEDURE — 71045 X-RAY EXAM CHEST 1 VIEW: CPT | Mod: 26

## 2024-10-20 RX ORDER — ALBUMIN HUMAN 50 G/1000ML
250 SOLUTION INTRAVENOUS ONCE
Refills: 0 | Status: COMPLETED | OUTPATIENT
Start: 2024-10-20 | End: 2024-10-20

## 2024-10-20 RX ADMIN — Medication 500 MILLIGRAM(S): at 12:04

## 2024-10-20 RX ADMIN — PANTOPRAZOLE SODIUM 40 MILLIGRAM(S): 40 TABLET, DELAYED RELEASE ORAL at 05:25

## 2024-10-20 RX ADMIN — SODIUM CHLORIDE 3 MILLILITER(S): 9 INJECTION, SOLUTION INTRAMUSCULAR; INTRAVENOUS; SUBCUTANEOUS at 13:55

## 2024-10-20 RX ADMIN — SODIUM CHLORIDE 3 MILLILITER(S): 9 INJECTION, SOLUTION INTRAMUSCULAR; INTRAVENOUS; SUBCUTANEOUS at 05:37

## 2024-10-20 RX ADMIN — ALBUMIN HUMAN 125 MILLILITER(S): 50 SOLUTION INTRAVENOUS at 07:50

## 2024-10-20 RX ADMIN — HEPARIN SODIUM 5000 UNIT(S): 10000 INJECTION INTRAVENOUS; SUBCUTANEOUS at 22:04

## 2024-10-20 RX ADMIN — Medication 1000 MILLIGRAM(S): at 05:25

## 2024-10-20 RX ADMIN — Medication 1000 MILLIGRAM(S): at 22:03

## 2024-10-20 RX ADMIN — CHLORHEXIDINE GLUCONATE 1 APPLICATION(S): 40 SOLUTION TOPICAL at 05:37

## 2024-10-20 RX ADMIN — GABAPENTIN 100 MILLIGRAM(S): 300 CAPSULE ORAL at 05:25

## 2024-10-20 RX ADMIN — HEPARIN SODIUM 5000 UNIT(S): 10000 INJECTION INTRAVENOUS; SUBCUTANEOUS at 05:26

## 2024-10-20 RX ADMIN — GABAPENTIN 100 MILLIGRAM(S): 300 CAPSULE ORAL at 14:17

## 2024-10-20 RX ADMIN — Medication 12.5 MILLIGRAM(S): at 18:27

## 2024-10-20 RX ADMIN — HEPARIN SODIUM 5000 UNIT(S): 10000 INJECTION INTRAVENOUS; SUBCUTANEOUS at 14:17

## 2024-10-20 RX ADMIN — Medication 80 MILLIGRAM(S): at 22:04

## 2024-10-20 RX ADMIN — MUPIROCIN 1 APPLICATION(S): 20 OINTMENT TOPICAL at 18:25

## 2024-10-20 RX ADMIN — SODIUM CHLORIDE 3 MILLILITER(S): 9 INJECTION, SOLUTION INTRAMUSCULAR; INTRAVENOUS; SUBCUTANEOUS at 21:08

## 2024-10-20 RX ADMIN — Medication 1000 MILLIGRAM(S): at 06:00

## 2024-10-20 RX ADMIN — Medication 81 MILLIGRAM(S): at 12:04

## 2024-10-20 RX ADMIN — GABAPENTIN 100 MILLIGRAM(S): 300 CAPSULE ORAL at 22:04

## 2024-10-20 RX ADMIN — MUPIROCIN 1 APPLICATION(S): 20 OINTMENT TOPICAL at 05:30

## 2024-10-20 NOTE — PROGRESS NOTE ADULT - ASSESSMENT
Patient is a 59 y.o M with PMH of HTN, HLD, pre DM, fam hx of CAD who was following w/ Cardiologist and had an abnormal stress test in 7/2024 and underwent a CCTA on 10/7/24 that was suspicious for CAD. On 10/10/24 he underwent a LHC at [facility] with Dr. Jackson which revealed 3v CAD (LM diffuse disease, moderate distal, LAD moderate diffuse prox, severe mid stenosis, severe diag stenosis, LCx: 70% OM stenosis, diffuse disease, RCA 80% mid distal stenosis, right dominant coronary circulation) & patient was referred to Dr. Jacob for surgical evaluation. He was admitted to [facility] on 10/14/24 for medical optimization prior to OR. On 10/16 he underwent an uncomplicated CABG X4 (LIMA-LAD, RA-OM, SVG-PDA, SVG-Diag), EF 55%. Post operatively he was brought to the CTICU intubated in stable condition. He was extubated in short course. POD1 substernal and pleural chest tubes were removed. POD2 given 1 dose midodrine for low BP. [place] [facility]. POD3 remains stable got first dose of BB in evening. POD 4 BPs soft in AM, BB held. Pending TTE from cardiology fellow for r/o effusion. Plan to try to restart BB in PM with possible DC tomorrow.     Plan:    Neurovascular:   -Pain well controlled with current regimen. PRN's: tylenol,     Cardiovascular:   -Hemodynamically stable.   -Monitor: BP, HR, tele  -s/p CABG    -c/w ASA    -plan to restart BB this evening metop 12.5 Q 12h  -HLD    -c/w lipitor    Respiratory:   -Oxygenating well on room air  -Encourage continued use of IS 10x/hr and frequent ambulation  -CXR: bilat pleural effusion    -bedside pocus revealed small effusions bilaterally with no window for drainage.     GI:  -GI PPX: protonix  -PO Diet  -Bowel Regimen: miralax, senna    Renal / :  -Continue to monitor renal function: BUN/Cr  -Monitor I/O's daily     Endocrine:    -No hx of DM or thyroid dx  -A1c: 5.6    -c/w ISS  -TSH: 6, T3, 113    Hematologic:  -CBC: H/H- 8.2/25  -Coagulation Panel.    ID:  -Temperature: 37.6  -CBC: WBC- 8.4  -Continue to observe for SIRS/Sepsis Syndrome.    Prophylaxis:  -DVT prophylaxis with 5000 SubQ Heparin q8h.  -Continue with SCD's b/l while patient is at rest     Disposition:  -Discharge home once patient is medically ready

## 2024-10-20 NOTE — PROGRESS NOTE ADULT - SUBJECTIVE AND OBJECTIVE BOX
Patient discussed on morning rounds with Dr. Smith    OPERATION & DATE: 10/16 CABGx4    SUBJECTIVE ASSESSMENT: resting comfortably in bed in NAD, no acute complaints today    VITAL SIGNS:  Vital Signs Last 24 Hrs  T(C): 36.6 (20 Oct 2024 13:19), Max: 37.6 (19 Oct 2024 17:27)  T(F): 97.9 (20 Oct 2024 13:19), Max: 99.6 (19 Oct 2024 17:27)  HR: 91 (20 Oct 2024 12:00) (80 - 96)  BP: 109/59 (20 Oct 2024 12:00) (88/61 - 130/66)  BP(mean): 79 (20 Oct 2024 12:00) (66 - 92)  RR: 15 (20 Oct 2024 12:00) (15 - 18)  SpO2: 92% (20 Oct 2024 12:00) (91% - 97%)    Parameters below as of 20 Oct 2024 12:00  Patient On (Oxygen Delivery Method): room air      I&O's Detail    19 Oct 2024 07:01  -  20 Oct 2024 07:00  --------------------------------------------------------  IN:    Albumin 5%  - 250 mL: 250 mL  Total IN: 250 mL    OUT:    Voided (mL): 1150 mL  Total OUT: 1150 mL    Total NET: -900 mL      20 Oct 2024 07:01  -  20 Oct 2024 13:36  --------------------------------------------------------  IN:    Oral Fluid: 240 mL  Total IN: 240 mL    OUT:    Voided (mL): 600 mL  Total OUT: 600 mL    Total NET: -360 mL        CHEST TUBE:  none  LEE DRAIN:  none  EPICARDIAL WIRES: in place  STITCHES: none  STAPLES: none  BARILLAS: none  CENTRAL LINE: none  MIDLINE/PICC: none  WOUND VAC: none    PHYSICAL EXAM:  General: resting comfortably in bed in NAD  Neurological: AOx3. Motor skills grossly intact  Cardiovascular: Normal S1/S2. Regular rate/rhythm. No murmurs  Respiratory: Lungs CTA bilaterally. No wheezing or rales  Gastrointestinal: +BS in all 4 quadrants. Non-distended. Soft. Non-tender  Extremities: Strength 5/5 b/l upper/lower extremities. Sensation grossly intact upper/lower extremities. No edema. No calf tenderness.  Vascular: Radial 2+bilaterally, DP 2+ b/l  Incision Sites: MSI clean, dry, intact      LABS:                        8.2    8.47  )-----------( 168      ( 20 Oct 2024 05:30 )             25.5       10-20    140  |  105  |  10  ----------------------------<  90  4.2   |  28  |  0.85    Ca    8.0[L]      20 Oct 2024 05:30  Mg     2.8     10-20      Urinalysis Basic - ( 20 Oct 2024 05:30 )    Color: x / Appearance: x / SG: x / pH: x  Gluc: 90 mg/dL / Ketone: x  / Bili: x / Urobili: x   Blood: x / Protein: x / Nitrite: x   Leuk Esterase: x / RBC: x / WBC x   Sq Epi: x / Non Sq Epi: x / Bacteria: x      MEDICATIONS  (STANDING):  acetaminophen     Tablet .. 1000 milliGRAM(s) Oral every 6 hours  ascorbic acid 500 milliGRAM(s) Oral daily  aspirin enteric coated 81 milliGRAM(s) Oral daily  atorvastatin 80 milliGRAM(s) Oral at bedtime  chlorhexidine 2% Cloths 1 Application(s) Topical daily  dextrose 5%. 1000 milliLiter(s) (50 mL/Hr) IV Continuous <Continuous>  dextrose 5%. 1000 milliLiter(s) (100 mL/Hr) IV Continuous <Continuous>  dextrose 50% Injectable 25 milliLiter(s) IV Push every 15 minutes  dextrose 50% Injectable 50 milliLiter(s) IV Push every 15 minutes  gabapentin 100 milliGRAM(s) Oral three times a day  glucagon  Injectable 1 milliGRAM(s) IntraMuscular once  heparin   Injectable 5000 Unit(s) SubCutaneous every 8 hours  influenza   Vaccine 0.5 milliLiter(s) IntraMuscular once  insulin lispro (ADMELOG) corrective regimen sliding scale   SubCutaneous at bedtime  insulin lispro (ADMELOG) corrective regimen sliding scale   SubCutaneous three times a day before meals  metoprolol tartrate 12.5 milliGRAM(s) Oral every 12 hours  mupirocin 2% Nasal 1 Application(s) Both Nostrils two times a day  pantoprazole    Tablet 40 milliGRAM(s) Oral before breakfast  polyethylene glycol 3350 17 Gram(s) Oral daily  senna 2 Tablet(s) Oral at bedtime  sodium chloride 0.9% lock flush 3 milliLiter(s) IV Push every 8 hours  sodium chloride 0.9%. 1000 milliLiter(s) (10 mL/Hr) IV Continuous <Continuous>    MEDICATIONS  (PRN):  dextrose Oral Gel 15 Gram(s) Oral once PRN Blood Glucose LESS THAN 70 milliGRAM(s)/deciliter  oxyCODONE    IR 5 milliGRAM(s) Oral every 6 hours PRN Moderate Pain (4 - 6)    RADIOLOGY & ADDITIONAL TESTS:

## 2024-10-21 ENCOUNTER — TRANSCRIPTION ENCOUNTER (OUTPATIENT)
Age: 59
End: 2024-10-21

## 2024-10-21 LAB
ANION GAP SERPL CALC-SCNC: 8 MMOL/L — SIGNIFICANT CHANGE UP (ref 5–17)
BUN SERPL-MCNC: 11 MG/DL — SIGNIFICANT CHANGE UP (ref 7–23)
CALCIUM SERPL-MCNC: 8.3 MG/DL — LOW (ref 8.4–10.5)
CHLORIDE SERPL-SCNC: 105 MMOL/L — SIGNIFICANT CHANGE UP (ref 96–108)
CO2 SERPL-SCNC: 25 MMOL/L — SIGNIFICANT CHANGE UP (ref 22–31)
CREAT SERPL-MCNC: 0.89 MG/DL — SIGNIFICANT CHANGE UP (ref 0.5–1.3)
EGFR: 98 ML/MIN/1.73M2 — SIGNIFICANT CHANGE UP
GLUCOSE BLDC GLUCOMTR-MCNC: 103 MG/DL — HIGH (ref 70–99)
GLUCOSE BLDC GLUCOMTR-MCNC: 133 MG/DL — HIGH (ref 70–99)
GLUCOSE BLDC GLUCOMTR-MCNC: 134 MG/DL — HIGH (ref 70–99)
GLUCOSE BLDC GLUCOMTR-MCNC: 144 MG/DL — HIGH (ref 70–99)
GLUCOSE SERPL-MCNC: 96 MG/DL — SIGNIFICANT CHANGE UP (ref 70–99)
HCT VFR BLD CALC: 25.2 % — LOW (ref 39–50)
HGB BLD-MCNC: 7.8 G/DL — LOW (ref 13–17)
MAGNESIUM SERPL-MCNC: 2.3 MG/DL — SIGNIFICANT CHANGE UP (ref 1.6–2.6)
MCHC RBC-ENTMCNC: 27.8 PG — SIGNIFICANT CHANGE UP (ref 27–34)
MCHC RBC-ENTMCNC: 31 GM/DL — LOW (ref 32–36)
MCV RBC AUTO: 89.7 FL — SIGNIFICANT CHANGE UP (ref 80–100)
NRBC # BLD: 0 /100 WBCS — SIGNIFICANT CHANGE UP (ref 0–0)
PLATELET # BLD AUTO: 213 K/UL — SIGNIFICANT CHANGE UP (ref 150–400)
POTASSIUM SERPL-MCNC: 4.2 MMOL/L — SIGNIFICANT CHANGE UP (ref 3.5–5.3)
POTASSIUM SERPL-SCNC: 4.2 MMOL/L — SIGNIFICANT CHANGE UP (ref 3.5–5.3)
RBC # BLD: 2.81 M/UL — LOW (ref 4.2–5.8)
RBC # FLD: 13.5 % — SIGNIFICANT CHANGE UP (ref 10.3–14.5)
SODIUM SERPL-SCNC: 138 MMOL/L — SIGNIFICANT CHANGE UP (ref 135–145)
WBC # BLD: 7.1 K/UL — SIGNIFICANT CHANGE UP (ref 3.8–10.5)
WBC # FLD AUTO: 7.1 K/UL — SIGNIFICANT CHANGE UP (ref 3.8–10.5)

## 2024-10-21 PROCEDURE — 71046 X-RAY EXAM CHEST 2 VIEWS: CPT | Mod: 26

## 2024-10-21 PROCEDURE — 71045 X-RAY EXAM CHEST 1 VIEW: CPT | Mod: 26,59

## 2024-10-21 RX ADMIN — HEPARIN SODIUM 5000 UNIT(S): 10000 INJECTION INTRAVENOUS; SUBCUTANEOUS at 08:39

## 2024-10-21 RX ADMIN — SODIUM CHLORIDE 3 MILLILITER(S): 9 INJECTION, SOLUTION INTRAMUSCULAR; INTRAVENOUS; SUBCUTANEOUS at 14:36

## 2024-10-21 RX ADMIN — GABAPENTIN 100 MILLIGRAM(S): 300 CAPSULE ORAL at 08:14

## 2024-10-21 RX ADMIN — Medication 12.5 MILLIGRAM(S): at 08:14

## 2024-10-21 RX ADMIN — Medication 80 MILLIGRAM(S): at 21:26

## 2024-10-21 RX ADMIN — Medication 500 MILLIGRAM(S): at 11:47

## 2024-10-21 RX ADMIN — GABAPENTIN 100 MILLIGRAM(S): 300 CAPSULE ORAL at 14:40

## 2024-10-21 RX ADMIN — Medication 1000 MILLIGRAM(S): at 14:39

## 2024-10-21 RX ADMIN — GABAPENTIN 100 MILLIGRAM(S): 300 CAPSULE ORAL at 21:26

## 2024-10-21 RX ADMIN — Medication 81 MILLIGRAM(S): at 11:47

## 2024-10-21 RX ADMIN — Medication 1000 MILLIGRAM(S): at 15:39

## 2024-10-21 RX ADMIN — HEPARIN SODIUM 5000 UNIT(S): 10000 INJECTION INTRAVENOUS; SUBCUTANEOUS at 14:40

## 2024-10-21 RX ADMIN — Medication 1000 MILLIGRAM(S): at 09:14

## 2024-10-21 RX ADMIN — Medication 1000 MILLIGRAM(S): at 08:14

## 2024-10-21 RX ADMIN — SODIUM CHLORIDE 3 MILLILITER(S): 9 INJECTION, SOLUTION INTRAMUSCULAR; INTRAVENOUS; SUBCUTANEOUS at 21:04

## 2024-10-21 RX ADMIN — MUPIROCIN 1 APPLICATION(S): 20 OINTMENT TOPICAL at 08:13

## 2024-10-21 RX ADMIN — Medication 1000 MILLIGRAM(S): at 19:25

## 2024-10-21 RX ADMIN — Medication 12.5 MILLIGRAM(S): at 17:09

## 2024-10-21 RX ADMIN — PANTOPRAZOLE SODIUM 40 MILLIGRAM(S): 40 TABLET, DELAYED RELEASE ORAL at 08:14

## 2024-10-21 RX ADMIN — HEPARIN SODIUM 5000 UNIT(S): 10000 INJECTION INTRAVENOUS; SUBCUTANEOUS at 21:25

## 2024-10-21 NOTE — DISCHARGE NOTE PROVIDER - PROVIDER TOKENS
PROVIDER:[TOKEN:[[id number]],FOLLOWUP:[1 week]],PROVIDER:[TOKEN:[[id number]],FOLLOWUP:[2 weeks]]

## 2024-10-21 NOTE — PROGRESS NOTE ADULT - SUBJECTIVE AND OBJECTIVE BOX
Patient discussed on morning rounds with Dr. Tom    OPERATION & DATE: CABG x 4 10/16    SUBJECTIVE ASSESSMENT: resting comfortably in bed in NAD, no acute complaints today    VITAL SIGNS:  Vital Signs Last 24 Hrs  T(C): 36.4 (21 Oct 2024 05:05), Max: 36.9 (20 Oct 2024 22:02)  T(F): 97.5 (21 Oct 2024 05:05), Max: 98.5 (20 Oct 2024 22:02)  HR: 95 (21 Oct 2024 08:08) (80 - 100)  BP: 118/64 (21 Oct 2024 08:08) (94/54 - 121/63)  BP(mean): 86 (21 Oct 2024 08:08) (69 - 86)  RR: 18 (21 Oct 2024 05:08) (13 - 18)  SpO2: 91% (21 Oct 2024 05:08) (91% - 95%)    Parameters below as of 21 Oct 2024 05:08  Patient On (Oxygen Delivery Method): room air      I&O's Detail    20 Oct 2024 07:01  -  21 Oct 2024 07:00  --------------------------------------------------------  IN:    Oral Fluid: 600 mL  Total IN: 600 mL    OUT:    Voided (mL): 2050 mL  Total OUT: 2050 mL    Total NET: -1450 mL        CHEST TUBE:  none  LEE DRAIN:  none  EPICARDIAL WIRES: in place  STITCHES: none  STAPLES: none  BARILLAS: none  CENTRAL LINE: none  MIDLINE/PICC: none  WOUND VAC: none    PHYSICAL EXAM:  General: resting comfortably in bed in [facility]  Neurological: AOx3. Motor skills grossly intact  Cardiovascular: Normal S1/S2. Regular rate/rhythm. No murmurs  Respiratory: Lungs CTA bilaterally. No wheezing or rales  Gastrointestinal: +BS in all 4 quadrants. Non-distended. Soft. Non-tender  Extremities: Strength 5/5 b/l upper/lower extremities. Sensation grossly intact upper/lower extremities. No edema. No calf tenderness.  Vascular: Radial 2+bilaterally, DP 2+ b/l  Incision Sites: MSI clean, dry, intact      LABS:                        7.8    7.10  )-----------( 213      ( 21 Oct 2024 05:30 )             25.2       10-21    138  |  105  |  11  ----------------------------<  96  4.2   |  25  |  0.89    Ca    8.3[L]      21 Oct 2024 05:30  Mg     2.3     10-21      Urinalysis Basic - ( 21 Oct 2024 05:30 )    Color: x / Appearance: x / SG: x / pH: x  Gluc: 96 mg/dL / Ketone: x  / Bili: x / Urobili: x   Blood: x / Protein: x / Nitrite: x   Leuk Esterase: x / RBC: x / WBC x   Sq Epi: x / Non Sq Epi: x / Bacteria: x      MEDICATIONS  (STANDING):  acetaminophen     Tablet .. 1000 milliGRAM(s) Oral every 6 hours  ascorbic acid 500 milliGRAM(s) Oral daily  aspirin enteric coated 81 milliGRAM(s) Oral daily  atorvastatin 80 milliGRAM(s) Oral at bedtime  chlorhexidine 2% Cloths 1 Application(s) Topical daily  dextrose 5%. 1000 milliLiter(s) (50 mL/Hr) IV Continuous <Continuous>  dextrose 5%. 1000 milliLiter(s) (100 mL/Hr) IV Continuous <Continuous>  dextrose 50% Injectable 25 milliLiter(s) IV Push every 15 minutes  dextrose 50% Injectable 50 milliLiter(s) IV Push every 15 minutes  gabapentin 100 milliGRAM(s) Oral three times a day  glucagon  Injectable 1 milliGRAM(s) IntraMuscular once  heparin   Injectable 5000 Unit(s) SubCutaneous every 8 hours  influenza   Vaccine 0.5 milliLiter(s) IntraMuscular once  insulin lispro (ADMELOG) corrective regimen sliding scale   SubCutaneous three times a day before meals  insulin lispro (ADMELOG) corrective regimen sliding scale   SubCutaneous at bedtime  metoprolol tartrate 12.5 milliGRAM(s) Oral every 12 hours  pantoprazole    Tablet 40 milliGRAM(s) Oral before breakfast  polyethylene glycol 3350 17 Gram(s) Oral daily  senna 2 Tablet(s) Oral at bedtime  sodium chloride 0.9% lock flush 3 milliLiter(s) IV Push every 8 hours  sodium chloride 0.9%. 1000 milliLiter(s) (10 mL/Hr) IV Continuous <Continuous>    MEDICATIONS  (PRN):  dextrose Oral Gel 15 Gram(s) Oral once PRN Blood Glucose LESS THAN 70 milliGRAM(s)/deciliter    RADIOLOGY & ADDITIONAL TESTS:

## 2024-10-21 NOTE — PROGRESS NOTE ADULT - ASSESSMENT
Patient is a 59 y.o M with PMH of HTN, HLD, pre DM, fam hx of CAD who was following w/ Cardiologist and had an abnormal stress test in 7/2024 and underwent a CCTA on 10/7/24 that was suspicious for CAD. On 10/10/24 he underwent a LHC at [facility] with Dr. Jackson which revealed 3v CAD (LM diffuse disease, moderate distal, LAD moderate diffuse prox, severe mid stenosis, severe diag stenosis, LCx: 70% OM stenosis, diffuse disease, RCA 80% mid distal stenosis, right dominant coronary circulation) & patient was referred to Dr. Jacob for surgical evaluation. He was admitted to [facility] on 10/14/24 for medical optimization prior to OR. On 10/16 he underwent an uncomplicated CABG X4 (LIMA-LAD, RA-OM, SVG-PDA, SVG-Diag), EF 55%. Post operatively he was brought to the CTICU intubated in stable condition. He was extubated in short course. POD1 substernal and pleural chest tubes were removed. POD2 given 1 dose midodrine for low BP. Transferred to [facility]. POD3 remains stable got first dose of BB in evening. POD 4 BPs soft in AM, BB held. TTE from cardiology fellow with no effusion. received PM BB dose. POD 5 BP robust this AM, getting mornign BB dose, plan for DC tomorrow if BP holds steady.       Plan:    Neurovascular:   -Pain well controlled with current regimen. PRN's: tylenol,     Cardiovascular:   -Hemodynamically stable.   -Monitor: BP, HR, tele  -s/p CABG    -c/w ASA    -Metop 12.5 BID  -HLD    -c/w lipitor    Respiratory:   -Oxygenating well on room air  -Encourage continued use of IS 10x/hr and frequent ambulation  -CXR: Pleural effusions improved      GI:  -GI PPX: protonix  -PO Diet  -Bowel Regimen: miralax, senna    Renal / :  -Continue to monitor renal function: BUN/Cr 11/.89  -Monitor I/O's daily     Endocrine:    -No hx of DM or thyroid dx  -A1c: 5.6    -c/w ISS  -TSH: 6, T3, 113    Hematologic:  -CBC: H/H- 7.8/25  -Coagulation Panel.    ID:  -Temperature: 36.9  -CBC: WBC- 7.1  -Continue to observe for SIRS/Sepsis Syndrome.    Prophylaxis:  -DVT prophylaxis with 5000 SubQ Heparin q8h.  -Continue with SCD's b/l while patient is at rest     Disposition:  -Discharge home once patient is medically ready

## 2024-10-21 NOTE — DISCHARGE NOTE PROVIDER - CARE PROVIDERS DIRECT ADDRESSES
[email or web address]

## 2024-10-21 NOTE — DISCHARGE NOTE PROVIDER - NSDCFUADDAPPT_GEN_ALL_CORE_FT
Our office will call you with the times and dates of your follow up appointments, if you do not hear from them by Wednesday, please call [phone].  Our office will call you with the times and dates of your follow up appointments, if you do not hear from them by Wednesday, please call [phone].     Please take Lasix until your follow-up appointment and at visit, they will decide to stop or continue it

## 2024-10-21 NOTE — DISCHARGE NOTE PROVIDER - NSDCCPTREATMENT_GEN_ALL_CORE_FT
PRINCIPAL PROCEDURE  Procedure: CABG, with PIECRE  Findings and Treatment: CABGx4 (LIMA-LAD, RA-OM, SVG-PDA, SVG-Diag)

## 2024-10-21 NOTE — DISCHARGE NOTE PROVIDER - CARE PROVIDER_API CALL
JESSICA Jacob  Thoracic and Cardiac Surgery  [address]  [place] [address]  Phone: [phone]  Fax: [phone]  Follow Up Time: 1 week    Abigail Jackson  Interventional Cardiology  [address] [place] [address]  Phone: [phone]  Fax: [phone]  Follow Up Time: 2 weeks

## 2024-10-21 NOTE — DISCHARGE NOTE PROVIDER - NSDCMRMEDTOKEN_GEN_ALL_CORE_FT
aspirin 81 mg oral tablet: orally once a day  rosuvastatin 40 mg oral tablet: 1 tab(s) orally once a day (at bedtime)  Toprol-XL 25 mg oral tablet, extended release: 1 tab(s) orally once a day (at bedtime)   acetaminophen 500 mg oral tablet: 1 tab(s) orally every 6 hours as needed for  mild pain  aspirin 81 mg oral delayed release tablet: 1 tab(s) orally once a day  atorvastatin 80 mg oral tablet: 1 tab(s) orally once a day (at bedtime)  Lasix 20 mg oral tablet: 1 tab(s) orally once a day Take with potassium  Metoprolol Tartrate 25 mg oral tablet: 0.5 tab(s) orally every 12 hours  pantoprazole 40 mg oral delayed release tablet: 1 tab(s) orally once a day (before a meal)  polyethylene glycol 3350 oral powder for reconstitution: 17 gram(s) orally once a day  potassium bicarbonate 10 mEq oral tablet, effervescent: 1 tab(s) orally once a day Take daily with lasix   acetaminophen 500 mg oral tablet: 1 tab(s) orally every 6 hours as needed for  mild pain  aspirin 81 mg oral delayed release tablet: 1 tab(s) orally once a day  atorvastatin 80 mg oral tablet: 1 tab(s) orally once a day (at bedtime)  Lasix 20 mg oral tablet: 1 tab(s) orally once a day Take with potassium  Metoprolol Tartrate 25 mg oral tablet: 0.5 tab(s) orally every 12 hours  oxyCODONE 5 mg oral tablet: 1 tab(s) orally every 6 hours as needed for  severe pain MDD: 4 tabs  pantoprazole 40 mg oral delayed release tablet: 1 tab(s) orally once a day (before a meal)  polyethylene glycol 3350 oral powder for reconstitution: 17 gram(s) orally once a day  Potassium Chloride (Eqv-Klor-Con 10) 10 mEq oral tablet, extended release: 1 tab(s) orally once a day

## 2024-10-21 NOTE — PROGRESS NOTE ADULT - PROVIDER SPECIALTY LIST ADULT
CT Surgery
CT Surgery
Critical Care
CT Surgery
CT Surgery
Critical Care

## 2024-10-21 NOTE — DISCHARGE NOTE PROVIDER - HOSPITAL COURSE
Patient discussed on morning rounds with     Operation Date: 10/18 OPCABx4 (LIMA-LAD, RA-OM, SVG-PDA, SVG-diag)  Primary Surgeon/Attending MD: Cecil  Referring Physician: Manuel  _ _ _ _ _ _ _ _ _ _ _ _   HOSPITAL COURSE:     Patient is a 59 y.o M with PMH of HTN, HLD, pre DM, fam hx of CAD who was following w/ Cardiologist and had an abnormal stress test in 7/2024 and underwent a CCTA on 10/7/24 that was suspicious for CAD. On 10/10/24 he underwent a LHC at [facility] with Dr. Jackson which revealed 3v CAD (LM diffuse disease, moderate distal, LAD moderate diffuse prox, severe mid stenosis, severe diag stenosis, LCx: 70% OM stenosis, diffuse disease, RCA 80% mid distal stenosis, right dominant coronary circulation) & patient was referred to Dr. Jacob for surgical evaluation. He was admitted to [facility] on 10/14/24 for medical optimization prior to OR. On 10/16 he underwent an uncomplicated CABG X4 (LIMA-LAD, RA-OM, SVG-PDA, SVG-Diag), EF 55%. Post operatively he was brought to the CTICU intubated in stable condition. He was extubated in short course. POD1 substernal and pleural chest tubes were removed. POD2 given 1 dose midodrine for low BP. Transferred to [facility]. POD3 remains stable got first dose of BB in evening. POD 4 BPs soft in AM, BB held. TTE from cardiology fellow with no effusion. received PM BB dose. POD 5 BP robust this AM, getting mornign BB dose, plan for DC tomorrow if BP holds steady. **INCOMPLETE NOTE**  _ _ _ _ _ _ _ _ _ _ _ _   ***If patient had CVA this admission, call neuro to complete discharge NIHSS     DISCHARGE PHYSICAL EXAM:     _ _ _ _ _ _ _ _ _ _ _ _   REMOVAL CHECKLIST:         [ x] Epicardial wires         [ x] Stitches/tie downs,   If no, why?          [ x] PICC/Midline,   If no, why?    _ _ _ _ _ _ _ _ _ _ _ _   MEDICATION DISCHARGE CHECKLIST     CABG         [ ] Aspirin, [  ] Contraindicated, Reason:         [ ] Plavix, [  ] Contraindicated, Reason:         [ ] Statin, [  ] Contraindicated, Reason:         [ ] Lasix , [  ] Contraindicated, Reason:              Duration:          [ ] Beta-Blocker, [  ] Contraindicated, Reason:           Anticoagulation         [ ] NOAC – Name, [ ] Reason:               Cost/Insurance barriers addressed: YES/NO          [ ] Coumadin, Indication:                INR Goal:               Follow up:   _ _ _ _ _ _ _ _ _ _ _   RELEVANT LABS/IMAGING:     _ _ _ _ _ _ _ _ _ _ _ _   Over 35 minutes was spent with the patient reviewing the discharge material including medications, follow up appointments, recovery, concerning symptoms, and how to contact their health care providers if they have questions.   Patient discussed on morning rounds with     Operation Date: 10/18 OPCABx4 (LIMA-LAD, RA-OM, SVG-PDA, SVG-diag)  Primary Surgeon/Attending MD: Cecil  Referring Physician: Manuel  _ _ _ _ _ _ _ _ _ _ _ _   HOSPITAL COURSE:     Patient is a 59 y.o M with PMH of HTN, HLD, pre DM, fam hx of CAD who was following w/ Cardiologist and had an abnormal stress test in 7/2024 and underwent a CCTA on 10/7/24 that was suspicious for CAD. On 10/10/24 he underwent a LHC at [facility] with Dr. Jackson which revealed 3v CAD (LM diffuse disease, moderate distal, LAD moderate diffuse prox, severe mid stenosis, severe diag stenosis, LCx: 70% OM stenosis, diffuse disease, RCA 80% mid distal stenosis, right dominant coronary circulation) & patient was referred to Dr. Jacob for surgical evaluation. He was admitted to [facility] on 10/14/24 for medical optimization prior to OR. On 10/16 he underwent an uncomplicated CABG X4 (LIMA-LAD, RA-OM, SVG-PDA, SVG-Diag), EF 55%. Post operatively he was brought to the CTICU intubated in stable condition. He was extubated in short course. POD1 substernal and pleural chest tubes were removed. POD2 given 1 dose midodrine for low BP. Transferred to [facility]. POD3 BB started. POD 4 BPs soft in AM, BB held. TTE from cardiology fellow with no effusion, received PM BB dose. POD 5 BP robust this AM, getting morning BB dose, plan for DC tomorrow if BP holds steady. **INCOMPLETE NOTE**  _ _ _ _ _ _ _ _ _ _ _ _   ***If patient had CVA this admission, call neuro to complete discharge NIHSS     DISCHARGE PHYSICAL EXAM:     _ _ _ _ _ _ _ _ _ _ _ _   REMOVAL CHECKLIST:         [ x] Epicardial wires         [ x] Stitches/tie downs,   If no, why?          [ x] PICC/Midline,   If no, why?    _ _ _ _ _ _ _ _ _ _ _ _   MEDICATION DISCHARGE CHECKLIST     CABG         [ ] Aspirin, [  ] Contraindicated, Reason:         [ ] Plavix, [  ] Contraindicated, Reason:         [ ] Statin, [  ] Contraindicated, Reason:         [ ] Lasix , [  ] Contraindicated, Reason:              Duration:          [ ] Beta-Blocker, [  ] Contraindicated, Reason:           Anticoagulation         [ ] NOAC – Name, [ ] Reason:               Cost/Insurance barriers addressed: YES/NO          [ ] Coumadin, Indication:                INR Goal:               Follow up:   _ _ _ _ _ _ _ _ _ _ _   RELEVANT LABS/IMAGING:     _ _ _ _ _ _ _ _ _ _ _ _   Over 35 minutes was spent with the patient reviewing the discharge material including medications, follow up appointments, recovery, concerning symptoms, and how to contact their health care providers if they have questions.   Patient discussed on morning rounds with Dr. Jacob  Operation Date: 10/18 OPCABx4 (LIMA-LAD, RA-OM, SVG-PDA, SVG-diag)  Primary Surgeon/Attending MD: Cecil  Referring Physician: Manuel  _ _ _ _ _ _ _ _ _ _ _ _   HOSPITAL COURSE:     Patient is a 59 y.o M with PMH of HTN, HLD, pre DM, fam hx of CAD who was following w/ Cardiologist and had an abnormal stress test in 7/2024 and underwent a CCTA on 10/7/24 that was suspicious for CAD. On 10/10/24 he underwent a LHC at [facility] with Dr. Jackson which revealed 3v CAD (LM diffuse disease, moderate distal, LAD moderate diffuse prox, severe mid stenosis, severe diag stenosis, LCx: 70% OM stenosis, diffuse disease, RCA 80% mid distal stenosis, right dominant coronary circulation) & patient was referred to Dr. Jacob for surgical evaluation. He was admitted to [facility] on 10/14/24 and on 10/16 he underwent an uncomplicated CABG X4 (LIMA-LAD, RA-OM, SVG-PDA, SVG-Diag), EF 55%. Post operatively he was brought to the CTICU intubated in stable condition. He was extubated in short course. POD1 substernal and pleural chest tubes were removed. POD2 given 1 dose midodrine for low BP. Transferred to [facility]. POD3 BB started. POD 4 BPs soft in AM, BB held. TTE from cardiology fellow with small effusion, per Dr. Tom this is stable and of no concern, will d/c patient home with lasix. POD 5 BP robust this AM, getting morning BB dose, plan for DC tomorrow if BP holds steady. POD 5 Cleared for discharge per Dr. Tom. At time of discharge he is hemodynamically stable, voiding well, passing gas, ambulating in the hallway, and pain controlled under oral regimen.    _ _ _ _ _ _ _ _ _ _ _ _     DISCHARGE PHYSICAL EXAM:   General: Sitting in bed comfortably in NAD  Neuro: A&Ox3, no focal deficits   HEENT: NCAT, EOMI   Cardiac: Regular rate and rhythm, normal S1 and S2. No m/r/g   Pulm: Breathing comfortably on room air. No signs of respiratory distress. Lungs are CTA b/l without wheezes, rales, or rhonchi   Abdomen: Soft, non-distended, non-tender. + bowel sounds   Extremities: Warm and well perfused, +1 pitting edema of b/l lower extremities, distal pulses 2+. No calf tenderness.   MSK: Full AROM   Wound: Sternotomy site is clean and without erythema or drainage. No sternal clicks. Yonis site clean and dressed. EVH sites well healing.     _ _ _ _ _ _ _ _ _ _ _ _   REMOVAL CHECKLIST:         [ x] Epicardial wires         [ x] Stitches/tie downs,   If no, why?          [ x] PICC/Midline,   If no, why?    _ _ _ _ _ _ _ _ _ _ _ _   MEDICATION DISCHARGE CHECKLIST     Pain Medications    CABG         [ Yes] Aspirin, [  ] Contraindicated, Reason:         [ No] Plavix, [  ] Contraindicated, Reason: Not indicated, per Dr. Jacob        [ Yes] Statin, [  ] Contraindicated, Reason:         [ Yes] Lasix , [  ] Contraindicated, Reason:              Duration:  2 weeks with supplemental K        [ Yes] Beta-Blocker, [  ] Contraindicated, Reason:     _ _ _ _ _ _ _ _ _ _ _   RELEVANT LABS/IMAGING:     _ _ _ _ _ _ _ _ _ _ _ _   Over 35 minutes was spent with the patient reviewing the discharge material including medications, follow up appointments, recovery, concerning symptoms, and how to contact their health care providers if they have questions.   Patient discussed on morning rounds with Dr. Jacob  Operation Date: 10/18 OPCABx4 (LIMA-LAD, RA-OM, SVG-PDA, SVG-diag)  Primary Surgeon/Attending MD: Cecil  Referring Physician: Manuel  _ _ _ _ _ _ _ _ _ _ _ _   HOSPITAL COURSE:   Patient is a 59 y.o M with PMH of HTN, HLD, pre DM, fam hx of CAD who was following w/ Cardiologist and had an abnormal stress test in 7/2024 and underwent a CCTA on 10/7/24 that was suspicious for CAD. On 10/10/24 he underwent a LHC at [facility] with Dr. Jackson which revealed 3v CAD (LM diffuse disease, moderate distal, LAD moderate diffuse prox, severe mid stenosis, severe diag stenosis, LCx: 70% OM stenosis, diffuse disease, RCA 80% mid distal stenosis, right dominant coronary circulation) & patient was referred to Dr. Jacob for surgical evaluation. He was admitted to [facility] on 10/14/24 and on 10/16 he underwent an uncomplicated CABG X4 (LIMA-LAD, RA-OM, SVG-PDA, SVG-Diag), EF 55%. Post operatively he was brought to the CTICU intubated in stable condition. He was extubated in short course. POD1 substernal and pleural chest tubes were removed. POD2 given 1 dose midodrine for low BP. Transferred to [facility]. POD3 BB started. POD 4 BPs soft in AM, BB held. TTE from cardiology fellow with small effusion, per Dr. Tom this is stable and of no concern, will d/c patient home with lasix. POD 5 BP robust this AM, getting morning BB dose, plan for DC tomorrow if BP holds steady. POD 5 Cleared for discharge per Dr. Tom. At time of discharge he is hemodynamically stable, voiding well, passing gas, ambulating in the hallway, and pain controlled under oral regimen.    _ _ _ _ _ _ _ _ _ _ _ _     DISCHARGE PHYSICAL EXAM:   General: Sitting in bed comfortably in NAD  Neuro: A&Ox3, no focal deficits   HEENT: NCAT, EOMI   Cardiac: Regular rate and rhythm, normal S1 and S2. No m/r/g   Pulm: Breathing comfortably on room air. No signs of respiratory distress. Lungs are CTA b/l without wheezes, rales, or rhonchi   Abdomen: Soft, non-distended, non-tender. + bowel sounds   Extremities: Warm and well perfused, +1 pitting edema of b/l lower extremities, distal pulses 2+. No calf tenderness.   MSK: Full AROM   Wound: Sternotomy site is clean and without erythema or drainage. No sternal clicks. Yonis site clean and dressed. EVH sites well healing.     _ _ _ _ _ _ _ _ _ _ _ _   REMOVAL CHECKLIST:         [ x] Epicardial wires         [ x] Stitches/tie downs,   If no, why?          [ x] PICC/Midline,   If no, why?    _ _ _ _ _ _ _ _ _ _ _ _   MEDICATION DISCHARGE CHECKLIST     Pain Medications    CABG         [ Yes] Aspirin, [  ] Contraindicated, Reason:         [ No] Plavix, [  ] Contraindicated, Reason: Not indicated, per Dr. Jacob        [ Yes] Statin, [  ] Contraindicated, Reason:         [ Yes] Lasix , [  ] Contraindicated, Reason:              Duration:  20 mg x 30 days, for office to decide to continue or stop        [ Yes] Beta-Blocker, [  ] Contraindicated, Reason:     _ _ _ _ _ _ _ _ _ _ _   RELEVANT LABS/IMAGING:     _ _ _ _ _ _ _ _ _ _ _ _   Over 35 minutes was spent with the patient reviewing the discharge material including medications, follow up appointments, recovery, concerning symptoms, and how to contact their health care providers if they have questions.   Patient discussed on morning rounds with Dr. Jacob  Operation Date: 10/18 OPCABx4 (LIMA-LAD, RA-OM, SVG-PDA, SVG-diag)  Primary Surgeon/Attending MD: Cecil  Referring Physician: Manuel  _ _ _ _ _ _ _ _ _ _ _ _   HOSPITAL COURSE:   Patient is a 59 y.o M with PMH of HTN, HLD, pre DM, fam hx of CAD who was following w/ Cardiologist and had an abnormal stress test in 7/2024 and underwent a CCTA on 10/7/24 that was suspicious for CAD. On 10/10/24 he underwent a LHC at [facility] with Dr. Jackson which revealed 3v CAD (LM diffuse disease, moderate distal, LAD moderate diffuse prox, severe mid stenosis, severe diag stenosis, LCx: 70% OM stenosis, diffuse disease, RCA 80% mid distal stenosis, right dominant coronary circulation) & patient was referred to Dr. Jacob for surgical evaluation. He was admitted to [facility] on 10/14/24 and on 10/16 he underwent an uncomplicated CABG X4 (LIMA-LAD, RA-OM, SVG-PDA, SVG-Diag), EF 55%. Post operatively he was brought to the CTICU intubated in stable condition. He was extubated in short course. POD1 substernal and pleural chest tubes were removed. POD2 given 1 dose midodrine for low BP. Transferred to [facility]. POD3 BB started. POD 4 BPs soft in AM, BB held. TTE from cardiology fellow with small effusion, per Dr. Tom this is stable and of no concern, will d/c patient home with lasix. POD 4 Cleared for discharge per Dr. Tom. At time of discharge he is hemodynamically stable, voiding well, passing gas, ambulating in the hallway, and pain controlled under oral regimen.    _ _ _ _ _ _ _ _ _ _ _ _     DISCHARGE PHYSICAL EXAM:   General: Sitting in bed comfortably in NAD  Neuro: A&Ox3, no focal deficits   HEENT: NCAT, EOMI   Cardiac: Regular rate and rhythm, normal S1 and S2. No m/r/g   Pulm: Breathing comfortably on room air. No signs of respiratory distress. Lungs are CTA b/l without wheezes, rales, or rhonchi   Abdomen: Soft, non-distended, non-tender. + bowel sounds   Extremities: Warm and well perfused, +1 pitting edema of b/l lower extremities, distal pulses 2+. No calf tenderness.   MSK: Full AROM   Wound: Sternotomy site is clean and without erythema or drainage. No sternal clicks. Yonis site clean and dressed. EVH sites well healing.     _ _ _ _ _ _ _ _ _ _ _ _   REMOVAL CHECKLIST:         [ x] Epicardial wires         [ x] Stitches/tie downs,   If no, why?          [ x] PICC/Midline,   If no, why?    _ _ _ _ _ _ _ _ _ _ _ _   MEDICATION DISCHARGE CHECKLIST     Pain Medications    CABG         [ Yes] Aspirin, [  ] Contraindicated, Reason:         [ No] Plavix, [  ] Contraindicated, Reason: Not indicated, per Dr. Jacob        [ Yes] Statin, [  ] Contraindicated, Reason:         [ Yes] Lasix , [  ] Contraindicated, Reason:              Duration:  20 mg x 30 days, for office to decide to continue or stop        [ Yes] Beta-Blocker, [  ] Contraindicated, Reason:     _ _ _ _ _ _ _ _ _ _ _   RELEVANT LABS/IMAGING:     _ _ _ _ _ _ _ _ _ _ _ _   Over 35 minutes was spent with the patient reviewing the discharge material including medications, follow up appointments, recovery, concerning symptoms, and how to contact their health care providers if they have questions.   Patient discussed on morning rounds with Dr. Jacob  Operation Date: 10/18 OPCABx4 (LIMA-LAD, RA-OM, SVG-PDA, SVG-diag)  Primary Surgeon/Attending MD: Cecil  Referring Physician: Manuel  _ _ _ _ _ _ _ _ _ _ _ _   HOSPITAL COURSE:   Patient is a 59 y.o M with PMH of HTN, HLD, pre DM, fam hx of CAD who was following w/ Cardiologist and had an abnormal stress test in 7/2024 and underwent a CCTA on 10/7/24 that was suspicious for CAD. On 10/10/24 he underwent a LHC at [facility] with Dr. Jackson which revealed 3v CAD (LM diffuse disease, moderate distal, LAD moderate diffuse prox, severe mid stenosis, severe diag stenosis, LCx: 70% OM stenosis, diffuse disease, RCA 80% mid distal stenosis, right dominant coronary circulation) & patient was referred to Dr. Jacob for surgical evaluation. He was admitted to [facility] on 10/14/24 and on 10/16 he underwent an uncomplicated CABG X4 (LIMA-LAD, RA-OM, SVG-PDA, SVG-Diag), EF 55%. Post operatively he was brought to the CTICU intubated in stable condition. He was extubated in short course. POD1 substernal and pleural chest tubes were removed. POD2 given 1 dose midodrine for low BP. Transferred to [facility]. POD3 BB started. POD 4 BPs soft in AM, BB held. TTE from cardiology fellow with small effusion, per Dr. Tom this is stable and of no concern, will d/c patient home with lasix. POD 5 Cleared for discharge per Dr. Tom. At time of discharge he is hemodynamically stable, voiding well, passing gas, ambulating in the hallway, and pain controlled under oral regimen.    _ _ _ _ _ _ _ _ _ _ _ _     DISCHARGE PHYSICAL EXAM:   General: Sitting in bed comfortably in NAD  Neuro: A&Ox3, no focal deficits   HEENT: NCAT, EOMI   Cardiac: Regular rate and rhythm, normal S1 and S2. No m/r/g   Pulm: Breathing comfortably on room air. No signs of respiratory distress. Lungs are CTA b/l without wheezes, rales, or rhonchi   Abdomen: Soft, non-distended, non-tender. + bowel sounds   Extremities: Warm and well perfused, +1 pitting edema of b/l lower extremities, distal pulses 2+. No calf tenderness.   MSK: Full AROM   Wound: Sternotomy site is clean and without erythema or drainage. No sternal clicks. Yonis site clean and dressed. EVH sites well healing.     _ _ _ _ _ _ _ _ _ _ _ _   REMOVAL CHECKLIST:         [ x] Epicardial wires         [ x] Stitches/tie downs,   If no, why?          [ x] PICC/Midline,   If no, why?    _ _ _ _ _ _ _ _ _ _ _ _   MEDICATION DISCHARGE CHECKLIST     Pain Medications    CABG         [ Yes] Aspirin, [  ] Contraindicated, Reason:         [ No] Plavix, [  ] Contraindicated, Reason: Not indicated, per Dr. Jacob        [ Yes] Statin, [  ] Contraindicated, Reason:         [ Yes] Lasix , [  ] Contraindicated, Reason:              Duration:  20 mg x 30 days, for office to decide to continue or stop        [ Yes] Beta-Blocker, [  ] Contraindicated, Reason:     _ _ _ _ _ _ _ _ _ _ _   RELEVANT LABS/IMAGING:     _ _ _ _ _ _ _ _ _ _ _ _   Over 35 minutes was spent with the patient reviewing the discharge material including medications, follow up appointments, recovery, concerning symptoms, and how to contact their health care providers if they have questions.   Patient discussed on morning rounds with Dr. Jacob  Operation Date: 10/18 OPCABx4 (LIMA-LAD, RA-OM, SVG-PDA, SVG-diag)  Primary Surgeon/Attending MD: Cecil  Referring Physician: Manuel  _ _ _ _ _ _ _ _ _ _ _ _   HOSPITAL COURSE:   Patient is a 59 y.o M with PMH of HTN, HLD, pre DM, fam hx of CAD who was following w/ Cardiologist and had an abnormal stress test in 7/2024 and underwent a CCTA on 10/7/24 that was suspicious for CAD. On 10/10/24 he underwent a LHC at [facility] with Dr. Jackson which revealed 3v CAD (LM diffuse disease, moderate distal, LAD moderate diffuse prox, severe mid stenosis, severe diag stenosis, LCx: 70% OM stenosis, diffuse disease, RCA 80% mid distal stenosis, right dominant coronary circulation) & patient was referred to Dr. Jacob for surgical evaluation. He was admitted to [facility] on 10/14/24 and on 10/16 he underwent an uncomplicated CABG X4 (LIMA-LAD, RA-OM, SVG-PDA, SVG-Diag), EF 55%. Post operatively he was brought to the CTICU intubated in stable condition. He was extubated in short course. POD1 substernal and pleural chest tubes were removed. POD2 given 1 dose midodrine for low BP. Transferred to [facility]. POD3 BB started. POD 4 BPs soft in AM, BB held. TTE from cardiology fellow with small effusion, per Dr. Tom this is stable and of no concern, will d/c patient home with lasix. POD 5 Cleared for discharge per Dr. Tom. At time of discharge he is hemodynamically stable, voiding well, passing gas, ambulating in the hallway, and pain controlled under oral regimen.    _ _ _ _ _ _ _ _ _ _ _ _     DISCHARGE PHYSICAL EXAM:   General: Sitting in bed comfortably in NAD  Neuro: A&Ox3, no focal deficits   HEENT: NCAT, EOMI   Cardiac: Regular rate and rhythm, normal S1 and S2. No m/r/g   Pulm: Breathing comfortably on room air. No signs of respiratory distress. Lungs are CTA b/l without wheezes, rales, or rhonchi   Abdomen: Soft, non-distended, non-tender. + bowel sounds   Extremities: Warm and well perfused, +1 pitting edema of b/l lower extremities, distal pulses 2+. No calf tenderness.   MSK: Full AROM   Wound: Sternotomy site is clean and without erythema or drainage. No sternal clicks. Yonis site clean and dressed. EVH sites well healing.     _ _ _ _ _ _ _ _ _ _ _ _   REMOVAL CHECKLIST:         [ x] Epicardial wires         [ x] Stitches/tie downs,   If no, why?          [ x] PICC/Midline,   If no, why?    _ _ _ _ _ _ _ _ _ _ _ _   MEDICATION DISCHARGE CHECKLIST     Pain Medications    CABG         [ Yes] Aspirin, [  ] Contraindicated, Reason:         [ No] Plavix, [  ] Contraindicated, Reason: Not indicated, per Dr. Jacob        [ Yes] Statin, [  ] Contraindicated, Reason:         [ Yes] Lasix , [  ] Contraindicated, Reason:              Duration:  20 mg x 30 days, for office to decide to continue or stop        [ Yes] Beta-Blocker, [  ] Contraindicated, Reason:     _ _ _ _ _ _ _ _ _ _ _   FOLLOW-UP LABS: Please preform BMP for K follow-up.     _ _ _ _ _ _ _ _ _ _ _ _   Over 35 minutes was spent with the patient reviewing the discharge material including medications, follow up appointments, recovery, concerning symptoms, and how to contact their health care providers if they have questions.

## 2024-10-22 ENCOUNTER — NON-APPOINTMENT (OUTPATIENT)
Age: 59
End: 2024-10-22

## 2024-10-22 VITALS
DIASTOLIC BLOOD PRESSURE: 65 MMHG | OXYGEN SATURATION: 100 % | SYSTOLIC BLOOD PRESSURE: 117 MMHG | RESPIRATION RATE: 18 BRPM | HEART RATE: 92 BPM

## 2024-10-22 LAB
ANION GAP SERPL CALC-SCNC: 8 MMOL/L — SIGNIFICANT CHANGE UP (ref 5–17)
BUN SERPL-MCNC: 10 MG/DL — SIGNIFICANT CHANGE UP (ref 7–23)
CALCIUM SERPL-MCNC: 8.3 MG/DL — LOW (ref 8.4–10.5)
CHLORIDE SERPL-SCNC: 108 MMOL/L — SIGNIFICANT CHANGE UP (ref 96–108)
CO2 SERPL-SCNC: 25 MMOL/L — SIGNIFICANT CHANGE UP (ref 22–31)
CREAT SERPL-MCNC: 0.81 MG/DL — SIGNIFICANT CHANGE UP (ref 0.5–1.3)
EGFR: 101 ML/MIN/1.73M2 — SIGNIFICANT CHANGE UP
GLUCOSE BLDC GLUCOMTR-MCNC: 115 MG/DL — HIGH (ref 70–99)
GLUCOSE BLDC GLUCOMTR-MCNC: 166 MG/DL — HIGH (ref 70–99)
GLUCOSE SERPL-MCNC: 98 MG/DL — SIGNIFICANT CHANGE UP (ref 70–99)
HCT VFR BLD CALC: 26 % — LOW (ref 39–50)
HGB BLD-MCNC: 8.3 G/DL — LOW (ref 13–17)
MAGNESIUM SERPL-MCNC: 2.3 MG/DL — SIGNIFICANT CHANGE UP (ref 1.6–2.6)
MCHC RBC-ENTMCNC: 28.5 PG — SIGNIFICANT CHANGE UP (ref 27–34)
MCHC RBC-ENTMCNC: 31.9 GM/DL — LOW (ref 32–36)
MCV RBC AUTO: 89.3 FL — SIGNIFICANT CHANGE UP (ref 80–100)
NRBC # BLD: 0 /100 WBCS — SIGNIFICANT CHANGE UP (ref 0–0)
PLATELET # BLD AUTO: 262 K/UL — SIGNIFICANT CHANGE UP (ref 150–400)
POTASSIUM SERPL-MCNC: 4.1 MMOL/L — SIGNIFICANT CHANGE UP (ref 3.5–5.3)
POTASSIUM SERPL-SCNC: 4.1 MMOL/L — SIGNIFICANT CHANGE UP (ref 3.5–5.3)
RBC # BLD: 2.91 M/UL — LOW (ref 4.2–5.8)
RBC # FLD: 13.5 % — SIGNIFICANT CHANGE UP (ref 10.3–14.5)
SODIUM SERPL-SCNC: 141 MMOL/L — SIGNIFICANT CHANGE UP (ref 135–145)
WBC # BLD: 8.59 K/UL — SIGNIFICANT CHANGE UP (ref 3.8–10.5)
WBC # FLD AUTO: 8.59 K/UL — SIGNIFICANT CHANGE UP (ref 3.8–10.5)

## 2024-10-22 PROCEDURE — C1889: CPT

## 2024-10-22 PROCEDURE — 81003 URINALYSIS AUTO W/O SCOPE: CPT

## 2024-10-22 PROCEDURE — 84480 ASSAY TRIIODOTHYRONINE (T3): CPT

## 2024-10-22 PROCEDURE — 97116 GAIT TRAINING THERAPY: CPT

## 2024-10-22 PROCEDURE — 82947 ASSAY GLUCOSE BLOOD QUANT: CPT

## 2024-10-22 PROCEDURE — 85014 HEMATOCRIT: CPT

## 2024-10-22 PROCEDURE — 84100 ASSAY OF PHOSPHORUS: CPT

## 2024-10-22 PROCEDURE — 82803 BLOOD GASES ANY COMBINATION: CPT

## 2024-10-22 PROCEDURE — 85027 COMPLETE CBC AUTOMATED: CPT

## 2024-10-22 PROCEDURE — 85610 PROTHROMBIN TIME: CPT

## 2024-10-22 PROCEDURE — 83036 HEMOGLOBIN GLYCOSYLATED A1C: CPT

## 2024-10-22 PROCEDURE — 80061 LIPID PANEL: CPT

## 2024-10-22 PROCEDURE — 84132 ASSAY OF SERUM POTASSIUM: CPT

## 2024-10-22 PROCEDURE — 71046 X-RAY EXAM CHEST 2 VIEWS: CPT

## 2024-10-22 PROCEDURE — 84443 ASSAY THYROID STIM HORMONE: CPT

## 2024-10-22 PROCEDURE — 71045 X-RAY EXAM CHEST 1 VIEW: CPT

## 2024-10-22 PROCEDURE — 36415 COLL VENOUS BLD VENIPUNCTURE: CPT

## 2024-10-22 PROCEDURE — C1729: CPT

## 2024-10-22 PROCEDURE — 85576 BLOOD PLATELET AGGREGATION: CPT

## 2024-10-22 PROCEDURE — 80053 COMPREHEN METABOLIC PANEL: CPT

## 2024-10-22 PROCEDURE — 85730 THROMBOPLASTIN TIME PARTIAL: CPT

## 2024-10-22 PROCEDURE — 94002 VENT MGMT INPAT INIT DAY: CPT

## 2024-10-22 PROCEDURE — 93005 ELECTROCARDIOGRAM TRACING: CPT

## 2024-10-22 PROCEDURE — 86891 AUTOLOGOUS BLOOD OP SALVAGE: CPT

## 2024-10-22 PROCEDURE — 84484 ASSAY OF TROPONIN QUANT: CPT

## 2024-10-22 PROCEDURE — 85025 COMPLETE CBC W/AUTO DIFF WBC: CPT

## 2024-10-22 PROCEDURE — 93308 TTE F-UP OR LMTD: CPT

## 2024-10-22 PROCEDURE — 83880 ASSAY OF NATRIURETIC PEPTIDE: CPT

## 2024-10-22 PROCEDURE — 82962 GLUCOSE BLOOD TEST: CPT

## 2024-10-22 PROCEDURE — 80048 BASIC METABOLIC PNL TOTAL CA: CPT

## 2024-10-22 PROCEDURE — 86850 RBC ANTIBODY SCREEN: CPT

## 2024-10-22 PROCEDURE — 84295 ASSAY OF SERUM SODIUM: CPT

## 2024-10-22 PROCEDURE — 97161 PT EVAL LOW COMPLEX 20 MIN: CPT

## 2024-10-22 PROCEDURE — 71250 CT THORAX DX C-: CPT | Mod: MC

## 2024-10-22 PROCEDURE — 86901 BLOOD TYPING SEROLOGIC RH(D): CPT

## 2024-10-22 PROCEDURE — C1751: CPT

## 2024-10-22 PROCEDURE — P9045: CPT

## 2024-10-22 PROCEDURE — 93880 EXTRACRANIAL BILAT STUDY: CPT

## 2024-10-22 PROCEDURE — 84436 ASSAY OF TOTAL THYROXINE: CPT

## 2024-10-22 PROCEDURE — C9399: CPT

## 2024-10-22 PROCEDURE — 82330 ASSAY OF CALCIUM: CPT

## 2024-10-22 PROCEDURE — 83735 ASSAY OF MAGNESIUM: CPT

## 2024-10-22 PROCEDURE — 94150 VITAL CAPACITY TEST: CPT

## 2024-10-22 PROCEDURE — 71045 X-RAY EXAM CHEST 1 VIEW: CPT | Mod: 26

## 2024-10-22 PROCEDURE — 86923 COMPATIBILITY TEST ELECTRIC: CPT

## 2024-10-22 PROCEDURE — 86900 BLOOD TYPING SEROLOGIC ABO: CPT

## 2024-10-22 RX ORDER — POTASSIUM BICARBONATE 782 MG/1
1 TABLET, EFFERVESCENT ORAL
Qty: 14 | Refills: 0
Start: 2024-10-22 | End: 2024-11-04

## 2024-10-22 RX ORDER — FUROSEMIDE 40 MG
1 TABLET ORAL
Qty: 30 | Refills: 0
Start: 2024-10-22 | End: 2024-11-20

## 2024-10-22 RX ORDER — POLYETHYLENE GLYCOL 3350 17 G/17G
17 POWDER, FOR SOLUTION ORAL
Qty: 510 | Refills: 0
Start: 2024-10-22 | End: 2024-11-20

## 2024-10-22 RX ORDER — POTASSIUM CHLORIDE 10 MEQ
1 TABLET, EXTENDED RELEASE ORAL
Qty: 30 | Refills: 0
Start: 2024-10-22 | End: 2024-11-20

## 2024-10-22 RX ORDER — ROSUVASTATIN CALCIUM 10 MG
1 TABLET ORAL
Refills: 0 | DISCHARGE

## 2024-10-22 RX ORDER — FUROSEMIDE 40 MG
1 TABLET ORAL
Qty: 14 | Refills: 0
Start: 2024-10-22 | End: 2024-11-04

## 2024-10-22 RX ORDER — METOPROLOL TARTRATE 50 MG
1 TABLET ORAL
Refills: 0 | DISCHARGE

## 2024-10-22 RX ORDER — OXYCODONE HYDROCHLORIDE 30 MG/1
1 TABLET ORAL
Qty: 20 | Refills: 0
Start: 2024-10-22 | End: 2024-10-26

## 2024-10-22 RX ORDER — ASPIRIN/MAG CARB/ALUMINUM AMIN 325 MG
1 TABLET ORAL
Qty: 0 | Refills: 0 | DISCHARGE
Start: 2024-10-22

## 2024-10-22 RX ORDER — ASPIRIN/MAG CARB/ALUMINUM AMIN 325 MG
0 TABLET ORAL
Refills: 0 | DISCHARGE

## 2024-10-22 RX ORDER — PANTOPRAZOLE SODIUM 40 MG/1
1 TABLET, DELAYED RELEASE ORAL
Qty: 30 | Refills: 0
Start: 2024-10-22 | End: 2024-11-20

## 2024-10-22 RX ORDER — METOPROLOL TARTRATE 50 MG
0.5 TABLET ORAL
Qty: 30 | Refills: 0
Start: 2024-10-22 | End: 2024-11-20

## 2024-10-22 RX ORDER — ACETAMINOPHEN 500 MG
1 TABLET ORAL
Qty: 120 | Refills: 0
Start: 2024-10-22 | End: 2024-11-20

## 2024-10-22 RX ADMIN — Medication 12.5 MILLIGRAM(S): at 05:37

## 2024-10-22 RX ADMIN — PANTOPRAZOLE SODIUM 40 MILLIGRAM(S): 40 TABLET, DELAYED RELEASE ORAL at 06:04

## 2024-10-22 RX ADMIN — CHLORHEXIDINE GLUCONATE 1 APPLICATION(S): 40 SOLUTION TOPICAL at 06:06

## 2024-10-22 RX ADMIN — Medication 81 MILLIGRAM(S): at 10:27

## 2024-10-22 RX ADMIN — SODIUM CHLORIDE 3 MILLILITER(S): 9 INJECTION, SOLUTION INTRAMUSCULAR; INTRAVENOUS; SUBCUTANEOUS at 05:23

## 2024-10-22 RX ADMIN — HEPARIN SODIUM 5000 UNIT(S): 10000 INJECTION INTRAVENOUS; SUBCUTANEOUS at 05:37

## 2024-10-22 RX ADMIN — Medication 1000 MILLIGRAM(S): at 10:02

## 2024-10-22 RX ADMIN — Medication 500 MILLIGRAM(S): at 10:27

## 2024-10-22 RX ADMIN — GABAPENTIN 100 MILLIGRAM(S): 300 CAPSULE ORAL at 05:37

## 2024-10-23 ENCOUNTER — NON-APPOINTMENT (OUTPATIENT)
Age: 59
End: 2024-10-23

## 2024-10-23 RX ORDER — NITROGLYCERIN 0.4 MG/1
0.4 TABLET SUBLINGUAL
Refills: 0 | Status: COMPLETED | COMMUNITY
Start: 2024-10-14 | End: 2024-10-23

## 2024-10-23 RX ORDER — ACETAMINOPHEN 500 MG/1
500 TABLET ORAL EVERY 6 HOURS
Refills: 0 | Status: ACTIVE | COMMUNITY
Start: 2024-10-23

## 2024-10-23 RX ORDER — METOPROLOL SUCCINATE 25 MG/1
25 TABLET, EXTENDED RELEASE ORAL DAILY
Refills: 0 | Status: COMPLETED | COMMUNITY
Start: 2024-10-14 | End: 2024-10-23

## 2024-10-23 RX ORDER — PANTOPRAZOLE 40 MG/1
40 TABLET, DELAYED RELEASE ORAL
Qty: 30 | Refills: 0 | Status: ACTIVE | COMMUNITY
Start: 2024-10-23

## 2024-10-23 RX ORDER — FUROSEMIDE 20 MG/1
20 TABLET ORAL DAILY
Refills: 0 | Status: ACTIVE | COMMUNITY
Start: 2024-10-23

## 2024-10-23 RX ORDER — ROSUVASTATIN CALCIUM 20 MG/1
20 TABLET, FILM COATED ORAL DAILY
Refills: 0 | Status: COMPLETED | COMMUNITY
Start: 2024-10-14 | End: 2024-10-23

## 2024-10-23 RX ORDER — POTASSIUM CHLORIDE 750 MG/1
10 TABLET, EXTENDED RELEASE ORAL DAILY
Refills: 0 | Status: ACTIVE | COMMUNITY
Start: 2024-10-23

## 2024-10-25 PROBLEM — I10 HYPERTENSION: Status: ACTIVE | Noted: 2024-10-25

## 2024-10-25 PROBLEM — E78.5 HYPERLIPIDEMIA: Status: ACTIVE | Noted: 2024-10-25

## 2024-10-25 PROBLEM — R73.03 PREDIABETES: Status: ACTIVE | Noted: 2024-10-25

## 2024-10-25 PROBLEM — I25.10 CAD (CORONARY ARTERY DISEASE): Status: ACTIVE | Noted: 2024-10-25

## 2024-10-25 PROBLEM — Z95.1 S/P CABG X 4: Status: ACTIVE | Noted: 2024-10-25

## 2024-10-27 DIAGNOSIS — I10 ESSENTIAL (PRIMARY) HYPERTENSION: ICD-10-CM

## 2024-10-27 DIAGNOSIS — Z79.82 LONG TERM (CURRENT) USE OF ASPIRIN: ICD-10-CM

## 2024-10-27 DIAGNOSIS — Z82.49 FAMILY HISTORY OF ISCHEMIC HEART DISEASE AND OTHER DISEASES OF THE CIRCULATORY SYSTEM: ICD-10-CM

## 2024-10-27 DIAGNOSIS — R73.03 PREDIABETES: ICD-10-CM

## 2024-10-27 DIAGNOSIS — Z88.0 ALLERGY STATUS TO PENICILLIN: ICD-10-CM

## 2024-10-27 DIAGNOSIS — I25.10 ATHEROSCLEROTIC HEART DISEASE OF NATIVE CORONARY ARTERY WITHOUT ANGINA PECTORIS: ICD-10-CM

## 2024-10-27 DIAGNOSIS — D69.6 THROMBOCYTOPENIA, UNSPECIFIED: ICD-10-CM

## 2024-10-27 DIAGNOSIS — E78.5 HYPERLIPIDEMIA, UNSPECIFIED: ICD-10-CM

## 2024-10-28 ENCOUNTER — NON-APPOINTMENT (OUTPATIENT)
Age: 59
End: 2024-10-28

## 2024-10-28 ENCOUNTER — APPOINTMENT (OUTPATIENT)
Dept: CARDIOTHORACIC SURGERY | Facility: CLINIC | Age: 59
End: 2024-10-28
Payer: COMMERCIAL

## 2024-10-28 ENCOUNTER — OUTPATIENT (OUTPATIENT)
Dept: OUTPATIENT SERVICES | Facility: HOSPITAL | Age: 59
LOS: 1 days | End: 2024-10-28
Payer: COMMERCIAL

## 2024-10-28 VITALS
SYSTOLIC BLOOD PRESSURE: 123 MMHG | DIASTOLIC BLOOD PRESSURE: 63 MMHG | BODY MASS INDEX: 28.12 KG/M2 | OXYGEN SATURATION: 94 % | TEMPERATURE: 97.2 F | HEART RATE: 86 BPM | WEIGHT: 175 LBS | HEIGHT: 66 IN

## 2024-10-28 DIAGNOSIS — Z98.890 OTHER SPECIFIED POSTPROCEDURAL STATES: Chronic | ICD-10-CM

## 2024-10-28 DIAGNOSIS — Z95.1 PRESENCE OF AORTOCORONARY BYPASS GRAFT: ICD-10-CM

## 2024-10-28 DIAGNOSIS — E78.5 HYPERLIPIDEMIA, UNSPECIFIED: ICD-10-CM

## 2024-10-28 DIAGNOSIS — R73.03 PREDIABETES.: ICD-10-CM

## 2024-10-28 DIAGNOSIS — I10 ESSENTIAL (PRIMARY) HYPERTENSION: ICD-10-CM

## 2024-10-28 DIAGNOSIS — I25.10 ATHEROSCLEROTIC HEART DISEASE OF NATIVE CORONARY ARTERY W/OUT ANGINA PECTORIS: ICD-10-CM

## 2024-10-28 PROCEDURE — 71046 X-RAY EXAM CHEST 2 VIEWS: CPT | Mod: 26

## 2024-10-28 PROCEDURE — 99024 POSTOP FOLLOW-UP VISIT: CPT

## 2024-10-28 PROCEDURE — 71046 X-RAY EXAM CHEST 2 VIEWS: CPT

## 2024-10-28 RX ORDER — METOPROLOL SUCCINATE 25 MG/1
25 TABLET, EXTENDED RELEASE ORAL DAILY
Refills: 0 | Status: ACTIVE | COMMUNITY

## 2024-10-28 RX ORDER — OXYCODONE 5 MG/1
5 TABLET ORAL EVERY 6 HOURS
Qty: 20 | Refills: 0 | Status: DISCONTINUED | COMMUNITY
Start: 2024-10-23 | End: 2024-10-28

## 2024-10-28 RX ORDER — METOPROLOL TARTRATE 25 MG/1
25 TABLET ORAL TWICE DAILY
Qty: 30 | Refills: 0 | Status: DISCONTINUED | COMMUNITY
Start: 2024-10-23 | End: 2024-10-28

## 2024-10-28 RX ORDER — ROSUVASTATIN CALCIUM 20 MG/1
20 TABLET, FILM COATED ORAL DAILY
Refills: 0 | Status: ACTIVE | COMMUNITY

## 2024-10-28 RX ORDER — POLYETHYLENE GLYCOL 3350 17 G/17G
17 POWDER, FOR SOLUTION ORAL DAILY
Qty: 1 | Refills: 0 | Status: DISCONTINUED | COMMUNITY
Start: 2024-10-23 | End: 2024-10-28

## 2024-10-28 RX ORDER — ATORVASTATIN CALCIUM 80 MG/1
80 TABLET, FILM COATED ORAL
Qty: 30 | Refills: 0 | Status: DISCONTINUED | COMMUNITY
Start: 2024-10-23 | End: 2024-10-28

## 2024-11-08 ENCOUNTER — NON-APPOINTMENT (OUTPATIENT)
Age: 59
End: 2024-11-08

## 2024-11-19 ENCOUNTER — APPOINTMENT (OUTPATIENT)
Dept: CARDIOTHORACIC SURGERY | Facility: CLINIC | Age: 59
End: 2024-11-19
Payer: COMMERCIAL

## 2024-11-19 DIAGNOSIS — Z95.1 PRESENCE OF AORTOCORONARY BYPASS GRAFT: ICD-10-CM

## 2024-11-19 PROCEDURE — 99024 POSTOP FOLLOW-UP VISIT: CPT

## (undated) DEVICE — PACK SCHEINERMAN OPEN HEART A

## (undated) DEVICE — SPECIMEN CONTAINER 100ML

## (undated) DEVICE — KIT DRSG CVC CHG 40/CA

## (undated) DEVICE — SUT VICRYL 3-0 27" SH

## (undated) DEVICE — CATH CV TRAY INSR ST UNIV

## (undated) DEVICE — SUT VICRYL 1 27" CT-1

## (undated) DEVICE — DRSG KLING 4"

## (undated) DEVICE — STOPCOCK 3 WAY

## (undated) DEVICE — DRSG DERMABOND 0.7ML

## (undated) DEVICE — SUT SILK 2-0 30" TIES

## (undated) DEVICE — CATH TRIOX OXIMETRY 8F 3 LUMENS

## (undated) DEVICE — TUBING SUCTION NONCONDUCTIVE 6MM X 12FT

## (undated) DEVICE — DRAPE TOWEL BLUE 17" X 24"

## (undated) DEVICE — BLADE STERN SYS 6 305X1MM

## (undated) DEVICE — TUBING SMOKE EVAC 3/8" X 10FT FOR NEPTUNE

## (undated) DEVICE — GLV 7.5 PROTEXIS (WHITE)

## (undated) DEVICE — BLADE SCALPEL SAFETY #10 WITH PLASTIC GREEN HANDLE

## (undated) DEVICE — SENSOR TERUMO SENSMART 8204CA ADULT/PED

## (undated) DEVICE — ELCTR BOVIE TIP BLADE MEGADYNE E-Z CLEAN 4" EXTENDED

## (undated) DEVICE — BLADE SCALPEL SAFETY #11 WITH PLASTIC GREEN HANDLE

## (undated) DEVICE — SUT SILK 3-0 30" BB

## (undated) DEVICE — SUT PROLENE 7-0 30" BV-1

## (undated) DEVICE — DRAPE IOBAN 33" X 23"

## (undated) DEVICE — PACING CABLE (BLUE) ATRIAL TEMP SCREW DOWN 12FT

## (undated) DEVICE — GAUZE SPONGE 12PLY DMT MT 8X4

## (undated) DEVICE — ELCTR BOVIE TIP BLADE VALLEYLAB 6.5"

## (undated) DEVICE — FOLEY TRAY 16FR 5CC LTX URINE METER TEMP SENSING CLOSED

## (undated) DEVICE — PACK PROC CV DRAPE

## (undated) DEVICE — BLADE SCALPEL SAFETY #15 WITH PLASTIC GREEN HANDLE

## (undated) DEVICE — SUT SILK 5-0 60" TIES

## (undated) DEVICE — DRSG MEPILEX 10 X 25CM (4 X 10") AG

## (undated) DEVICE — PREP CHLORAPREP HI-LITE ORANGE 26ML

## (undated) DEVICE — DRSG ACE BANDAGE 6" LF STERILE

## (undated) DEVICE — POSITIONER FOAM EGG CRATE ULNAR 2PCS (PINK)

## (undated) DEVICE — DRAPE LIGHT HANDLE COVER (GREEN)

## (undated) DEVICE — CLIPPER BLADE GENERAL USE

## (undated) DEVICE — DRSG TEGADERM 4X4.75"

## (undated) DEVICE — DRSG STOCKINETTE IMPERVIOUS MED

## (undated) DEVICE — PACING CABLE (BROWN) A/V TEMP SCREW DOWN 12FT

## (undated) DEVICE — PUNCH VASC STD 7.75" HANDLE 4MM DISP

## (undated) DEVICE — DRAPE OR CAMERA COVER

## (undated) DEVICE — PREP SCRUB BRUSH W CHG 4%

## (undated) DEVICE — VASOVIEW HEMOPRO 2

## (undated) DEVICE — SUT VICRYL 4-0 18" PS-2 UNDYED

## (undated) DEVICE — DRSG TRACH DRAINAGE 4X4

## (undated) DEVICE — DRAPE SLUSH / WARMER 44 X 66"

## (undated) DEVICE — DRAPE PROBE COVER 5" X 96"

## (undated) DEVICE — GOWN LG

## (undated) DEVICE — DRSG ACE BANDAGE 6"

## (undated) DEVICE — SUT VICRYL 3-0 27" MH

## (undated) DEVICE — SUT PERMAHAND 1 10-30"

## (undated) DEVICE — PACK SCHEINERMAN CABAG

## (undated) DEVICE — MULTIPLE PERFUSION SET FEMALE 1 INLET LEG W 4 LEGS / VENT 15" (RED/RED)

## (undated) DEVICE — DRSG STOCKINETTE IMPERVIOUS XL

## (undated) DEVICE — DRSG ALLEVYN LIFE 6 X 6 (PINK)

## (undated) DEVICE — ELCTR BOVIE PENCIL HANDPIECE ROCKER SWITCH 15FT

## (undated) DEVICE — TUBING STRYKER PNEUMOCLEAR HIGH FLOW

## (undated) DEVICE — BLOWER MISTER AXIUS WITH IV SET

## (undated) DEVICE — SUT STAINLESS STEEL 6 4-18" CCS

## (undated) DEVICE — SUT SILK 1 30" MH

## (undated) DEVICE — CATH IV SAFE BC 24G X 0.75" (YELLOW)

## (undated) DEVICE — ELCTR BOVIE TIP BLADE INSULATED 3" EDGE

## (undated) DEVICE — CHEST DRAIN PLEUR-EVAC DRY/WET ADULT-PEDS SINGLE (QUICK)

## (undated) DEVICE — SUT PROLENE 4-0 36" BB

## (undated) DEVICE — SUT PROLENE BV 130-5 8-0

## (undated) DEVICE — ELCTR STRYKER NEPTUNE SMOKE EVACUATION PENCIL (GREEN)

## (undated) DEVICE — SUT PROLENE 6-0 30" C-1

## (undated) DEVICE — SUT PROLENE 3-0 36" SH

## (undated) DEVICE — ELCTR ZOLL DEFIBRILLATOR PAD NO REPLACEMENT

## (undated) DEVICE — GEL AQUSNC PACKET 20GR

## (undated) DEVICE — STEALTH CLAMP INSERT FIBRA/FIBRA 90MM

## (undated) DEVICE — SUT SILK 4-0 12-30" TIES